# Patient Record
Sex: FEMALE | Race: OTHER | NOT HISPANIC OR LATINO | URBAN - METROPOLITAN AREA
[De-identification: names, ages, dates, MRNs, and addresses within clinical notes are randomized per-mention and may not be internally consistent; named-entity substitution may affect disease eponyms.]

---

## 2024-04-14 ENCOUNTER — EMERGENCY (EMERGENCY)
Facility: HOSPITAL | Age: 34
LOS: 0 days | Discharge: HOME | End: 2024-04-14
Attending: STUDENT IN AN ORGANIZED HEALTH CARE EDUCATION/TRAINING PROGRAM
Payer: COMMERCIAL

## 2024-04-14 VITALS
HEART RATE: 74 BPM | TEMPERATURE: 99 F | WEIGHT: 134.92 LBS | HEIGHT: 60 IN | OXYGEN SATURATION: 98 % | RESPIRATION RATE: 18 BRPM | SYSTOLIC BLOOD PRESSURE: 112 MMHG | DIASTOLIC BLOOD PRESSURE: 66 MMHG

## 2024-04-14 DIAGNOSIS — R10.9 UNSPECIFIED ABDOMINAL PAIN: ICD-10-CM

## 2024-04-14 DIAGNOSIS — O21.9 VOMITING OF PREGNANCY, UNSPECIFIED: ICD-10-CM

## 2024-04-14 DIAGNOSIS — O26.891 OTHER SPECIFIED PREGNANCY RELATED CONDITIONS, FIRST TRIMESTER: ICD-10-CM

## 2024-04-14 DIAGNOSIS — Z3A.01 LESS THAN 8 WEEKS GESTATION OF PREGNANCY: ICD-10-CM

## 2024-04-14 LAB
ALBUMIN SERPL ELPH-MCNC: 4.2 G/DL — SIGNIFICANT CHANGE UP (ref 3.5–5.2)
ALLERGY+IMMUNOLOGY DIAG STUDY NOTE: SIGNIFICANT CHANGE UP
ALP SERPL-CCNC: 65 U/L — SIGNIFICANT CHANGE UP (ref 30–115)
ALT FLD-CCNC: 8 U/L — SIGNIFICANT CHANGE UP (ref 0–41)
ANION GAP SERPL CALC-SCNC: 12 MMOL/L — SIGNIFICANT CHANGE UP (ref 7–14)
APPEARANCE UR: ABNORMAL
AST SERPL-CCNC: 13 U/L — SIGNIFICANT CHANGE UP (ref 0–41)
BACTERIA # UR AUTO: ABNORMAL /HPF
BASOPHILS # BLD AUTO: 0.02 K/UL — SIGNIFICANT CHANGE UP (ref 0–0.2)
BASOPHILS NFR BLD AUTO: 0.3 % — SIGNIFICANT CHANGE UP (ref 0–1)
BILIRUB SERPL-MCNC: 0.4 MG/DL — SIGNIFICANT CHANGE UP (ref 0.2–1.2)
BILIRUB UR-MCNC: NEGATIVE — SIGNIFICANT CHANGE UP
BLD GP AB SCN SERPL QL: SIGNIFICANT CHANGE UP
BUN SERPL-MCNC: 6 MG/DL — LOW (ref 10–20)
CALCIUM SERPL-MCNC: 9.3 MG/DL — SIGNIFICANT CHANGE UP (ref 8.4–10.5)
CAST: 2 /LPF — SIGNIFICANT CHANGE UP (ref 0–4)
CHLORIDE SERPL-SCNC: 103 MMOL/L — SIGNIFICANT CHANGE UP (ref 98–110)
CO2 SERPL-SCNC: 20 MMOL/L — SIGNIFICANT CHANGE UP (ref 17–32)
COLOR SPEC: YELLOW — SIGNIFICANT CHANGE UP
CREAT SERPL-MCNC: 0.7 MG/DL — SIGNIFICANT CHANGE UP (ref 0.7–1.5)
DIFF PNL FLD: NEGATIVE — SIGNIFICANT CHANGE UP
DIR ANTIGLOB POLYSPECIFIC INTERPRETATION: SIGNIFICANT CHANGE UP
EGFR: 117 ML/MIN/1.73M2 — SIGNIFICANT CHANGE UP
EOSINOPHIL # BLD AUTO: 0.07 K/UL — SIGNIFICANT CHANGE UP (ref 0–0.7)
EOSINOPHIL NFR BLD AUTO: 1 % — SIGNIFICANT CHANGE UP (ref 0–8)
GLUCOSE SERPL-MCNC: 79 MG/DL — SIGNIFICANT CHANGE UP (ref 70–99)
GLUCOSE UR QL: NEGATIVE MG/DL — SIGNIFICANT CHANGE UP
HCG SERPL-ACNC: HIGH MIU/ML
HCT VFR BLD CALC: 38.9 % — SIGNIFICANT CHANGE UP (ref 37–47)
HGB BLD-MCNC: 13.1 G/DL — SIGNIFICANT CHANGE UP (ref 12–16)
IMM GRANULOCYTES NFR BLD AUTO: 0.1 % — SIGNIFICANT CHANGE UP (ref 0.1–0.3)
KETONES UR-MCNC: NEGATIVE MG/DL — SIGNIFICANT CHANGE UP
LACTATE SERPL-SCNC: 1.1 MMOL/L — SIGNIFICANT CHANGE UP (ref 0.7–2)
LEUKOCYTE ESTERASE UR-ACNC: ABNORMAL
LIDOCAIN IGE QN: 44 U/L — SIGNIFICANT CHANGE UP (ref 7–60)
LYMPHOCYTES # BLD AUTO: 1.49 K/UL — SIGNIFICANT CHANGE UP (ref 1.2–3.4)
LYMPHOCYTES # BLD AUTO: 20.8 % — SIGNIFICANT CHANGE UP (ref 20.5–51.1)
MCHC RBC-ENTMCNC: 28.2 PG — SIGNIFICANT CHANGE UP (ref 27–31)
MCHC RBC-ENTMCNC: 33.7 G/DL — SIGNIFICANT CHANGE UP (ref 32–37)
MCV RBC AUTO: 83.7 FL — SIGNIFICANT CHANGE UP (ref 81–99)
MONOCYTES # BLD AUTO: 0.63 K/UL — HIGH (ref 0.1–0.6)
MONOCYTES NFR BLD AUTO: 8.8 % — SIGNIFICANT CHANGE UP (ref 1.7–9.3)
NEUTROPHILS # BLD AUTO: 4.93 K/UL — SIGNIFICANT CHANGE UP (ref 1.4–6.5)
NEUTROPHILS NFR BLD AUTO: 69 % — SIGNIFICANT CHANGE UP (ref 42.2–75.2)
NITRITE UR-MCNC: NEGATIVE — SIGNIFICANT CHANGE UP
NRBC # BLD: 0 /100 WBCS — SIGNIFICANT CHANGE UP (ref 0–0)
PH UR: 7.5 — SIGNIFICANT CHANGE UP (ref 5–8)
PLATELET # BLD AUTO: 210 K/UL — SIGNIFICANT CHANGE UP (ref 130–400)
PMV BLD: 11.7 FL — HIGH (ref 7.4–10.4)
POTASSIUM SERPL-MCNC: 4.2 MMOL/L — SIGNIFICANT CHANGE UP (ref 3.5–5)
POTASSIUM SERPL-SCNC: 4.2 MMOL/L — SIGNIFICANT CHANGE UP (ref 3.5–5)
PROT SERPL-MCNC: 6.8 G/DL — SIGNIFICANT CHANGE UP (ref 6–8)
PROT UR-MCNC: NEGATIVE MG/DL — SIGNIFICANT CHANGE UP
RBC # BLD: 4.65 M/UL — SIGNIFICANT CHANGE UP (ref 4.2–5.4)
RBC # FLD: 14.2 % — SIGNIFICANT CHANGE UP (ref 11.5–14.5)
RBC CASTS # UR COMP ASSIST: 2 /HPF — SIGNIFICANT CHANGE UP (ref 0–4)
SODIUM SERPL-SCNC: 135 MMOL/L — SIGNIFICANT CHANGE UP (ref 135–146)
SP GR SPEC: 1.01 — SIGNIFICANT CHANGE UP (ref 1–1.03)
SQUAMOUS # UR AUTO: 6 /HPF — HIGH (ref 0–5)
UROBILINOGEN FLD QL: 0.2 MG/DL — SIGNIFICANT CHANGE UP (ref 0.2–1)
WBC # BLD: 7.15 K/UL — SIGNIFICANT CHANGE UP (ref 4.8–10.8)
WBC # FLD AUTO: 7.15 K/UL — SIGNIFICANT CHANGE UP (ref 4.8–10.8)
WBC UR QL: 14 /HPF — HIGH (ref 0–5)

## 2024-04-14 PROCEDURE — 86870 RBC ANTIBODY IDENTIFICATION: CPT

## 2024-04-14 PROCEDURE — 86077 PHYS BLOOD BANK SERV XMATCH: CPT

## 2024-04-14 PROCEDURE — 84702 CHORIONIC GONADOTROPIN TEST: CPT

## 2024-04-14 PROCEDURE — 99284 EMERGENCY DEPT VISIT MOD MDM: CPT | Mod: 25

## 2024-04-14 PROCEDURE — 83605 ASSAY OF LACTIC ACID: CPT

## 2024-04-14 PROCEDURE — 80053 COMPREHEN METABOLIC PANEL: CPT

## 2024-04-14 PROCEDURE — 85025 COMPLETE CBC W/AUTO DIFF WBC: CPT

## 2024-04-14 PROCEDURE — 76830 TRANSVAGINAL US NON-OB: CPT | Mod: 26

## 2024-04-14 PROCEDURE — 86850 RBC ANTIBODY SCREEN: CPT

## 2024-04-14 PROCEDURE — 86900 BLOOD TYPING SEROLOGIC ABO: CPT

## 2024-04-14 PROCEDURE — 86880 COOMBS TEST DIRECT: CPT

## 2024-04-14 PROCEDURE — 86901 BLOOD TYPING SEROLOGIC RH(D): CPT

## 2024-04-14 PROCEDURE — 81001 URINALYSIS AUTO W/SCOPE: CPT

## 2024-04-14 PROCEDURE — 36415 COLL VENOUS BLD VENIPUNCTURE: CPT

## 2024-04-14 PROCEDURE — 87077 CULTURE AEROBIC IDENTIFY: CPT

## 2024-04-14 PROCEDURE — 83690 ASSAY OF LIPASE: CPT

## 2024-04-14 PROCEDURE — 87086 URINE CULTURE/COLONY COUNT: CPT

## 2024-04-14 PROCEDURE — 76830 TRANSVAGINAL US NON-OB: CPT

## 2024-04-14 PROCEDURE — 96374 THER/PROPH/DIAG INJ IV PUSH: CPT

## 2024-04-14 PROCEDURE — 99284 EMERGENCY DEPT VISIT MOD MDM: CPT

## 2024-04-14 RX ORDER — ONDANSETRON 8 MG/1
4 TABLET, FILM COATED ORAL ONCE
Refills: 0 | Status: COMPLETED | OUTPATIENT
Start: 2024-04-14 | End: 2024-04-14

## 2024-04-14 RX ORDER — ONDANSETRON 8 MG/1
1 TABLET, FILM COATED ORAL
Qty: 1 | Refills: 0
Start: 2024-04-14 | End: 2024-04-16

## 2024-04-14 RX ORDER — SODIUM CHLORIDE 9 MG/ML
2000 INJECTION, SOLUTION INTRAVENOUS ONCE
Refills: 0 | Status: COMPLETED | OUTPATIENT
Start: 2024-04-14 | End: 2024-04-14

## 2024-04-14 RX ADMIN — SODIUM CHLORIDE 2000 MILLILITER(S): 9 INJECTION, SOLUTION INTRAVENOUS at 14:06

## 2024-04-14 RX ADMIN — ONDANSETRON 4 MILLIGRAM(S): 8 TABLET, FILM COATED ORAL at 14:05

## 2024-04-14 NOTE — ED PROVIDER NOTE - NSFOLLOWUPCLINICS_GEN_ALL_ED_FT
[facility]  OB/GYN  [address] [place] [address]  Phone: [phone]  Fax:   Follow Up Time: 1-3 Days

## 2024-04-14 NOTE — ED PROVIDER NOTE - PATIENT PORTAL LINK FT
You can access the FollowMyHealth Patient Portal offered by [facility] by registering at the following website: [email or web address]. By joining [employer]’s FollowMyHealth portal, you will also be able to view your health information using other applications (apps) compatible with our system.

## 2024-04-14 NOTE — ED PROVIDER NOTE - CLINICAL SUMMARY MEDICAL DECISION MAKING FREE TEXT BOX
Patient with nausea vomiting G2, P0 nausea 5 days currently 6 weeks pregnant abdominal cramping not improved with B6 and doxylamine no vaginal bleeding    Pt feeling improved, tolerating PO, abdomen soft, non-tender, non-distended, no rebound, no guarding.   Imaging was ordered and reviewed by me.  Appropriate medications for patient's presenting complaints were ordered and effects were reassessed.  Patient's records (prior hospital, ED visit, and/or nursing home notes if available) were reviewed.  Additional history was obtained from EMS, family, and/or PCP (where available).  Escalation to admission/observation was considered.  However patient feels much better and is comfortable with discharge.  Appropriate follow-up was arranged. Results reviewed and discussed with pt and printed for patient. Anticipatory guidance given including close outpatient followup. Strict return precautions given. Pt verbalizes understanding of and agrees with plan.

## 2024-04-14 NOTE — ED ADULT NURSE NOTE - OBJECTIVE STATEMENT
Pt 6 weeks pregnant c/o nausea vomiting with abd cramping, no vaginal bleeding. Pt has had appointment w/ OBGYN, placed on Diclegis w/ no effect. OBGYN aware of abd cramping, patient states OB was unable to visualize pregnancy due to recent pregnancy.

## 2024-04-14 NOTE — ED ADULT TRIAGE NOTE - WEIGHT IN KG
[facility] calling, received rx for rebif, pharmacy [facility] is ready to ship, 4 phone calls have been made out the patient by  [employer] and patient cannot be reached, [facility] cannot be reached. Pt is no returning phone calls to pharmacy or [employer].    If nurse is able to contact patient, [facility] has left several message with patient, she can call [phone]

## 2024-04-14 NOTE — ED ADULT NURSE NOTE - NSFALLUNIVINTERV_ED_ALL_ED
Bed/Stretcher in lowest position, wheels locked, appropriate side rails in place/Call bell, personal items and telephone in reach/Instruct patient to call for assistance before getting out of bed/chair/stretcher/Non-slip footwear applied when patient is off stretcher/[place] to call system/Physically safe environment - no spills, clutter or unnecessary equipment/Purposeful proactive rounding/Room/bathroom lighting operational, light cord in reach

## 2024-04-14 NOTE — ED PROVIDER NOTE - OBJECTIVE STATEMENT
33-year-old female G2, P0 presents with nausea x 5 days.  Patient is currently 6 weeks pregnant.  Has been having nausea with mild abd cramping. Denies chest pain shortness of breath difficulty breathing recent travels cough cold congestion.  Saw her OB/GYN as has been taking B6 and doxylamine with minimal relief.  Denies any vaginal bleeding patient has appointment with her OB/GYN for transvaginal ultrasound next week.  On previous ultrasound was found to have subchorionic hemorrhage.    As per lab patient found to have positive antibodies.  Admits that in February 2024 had a miscarriage and received RhoGAM.

## 2024-04-14 NOTE — ED PROVIDER NOTE - PROGRESS NOTE DETAILS
ss Lab will add betahcg now.   Tolerated p.o.  Reviewed labs and imaging with patient and mother at bedside.  Patient will follow-up with OB and strict return precautions given

## 2024-04-16 LAB
CULTURE RESULTS: ABNORMAL
SPECIMEN SOURCE: SIGNIFICANT CHANGE UP

## 2024-04-17 ENCOUNTER — EMERGENCY (EMERGENCY)
Facility: HOSPITAL | Age: 34
LOS: 0 days | Discharge: HOME | End: 2024-04-18
Attending: STUDENT IN AN ORGANIZED HEALTH CARE EDUCATION/TRAINING PROGRAM
Payer: COMMERCIAL

## 2024-04-17 VITALS
WEIGHT: 138.01 LBS | RESPIRATION RATE: 18 BRPM | HEIGHT: 60 IN | SYSTOLIC BLOOD PRESSURE: 105 MMHG | HEART RATE: 72 BPM | OXYGEN SATURATION: 100 % | DIASTOLIC BLOOD PRESSURE: 61 MMHG | TEMPERATURE: 98 F

## 2024-04-17 DIAGNOSIS — O21.9 VOMITING OF PREGNANCY, UNSPECIFIED: ICD-10-CM

## 2024-04-17 DIAGNOSIS — Z3A.01 LESS THAN 8 WEEKS GESTATION OF PREGNANCY: ICD-10-CM

## 2024-04-17 PROCEDURE — 99284 EMERGENCY DEPT VISIT MOD MDM: CPT | Mod: 25

## 2024-04-17 PROCEDURE — 85025 COMPLETE CBC W/AUTO DIFF WBC: CPT

## 2024-04-17 PROCEDURE — 99284 EMERGENCY DEPT VISIT MOD MDM: CPT

## 2024-04-17 PROCEDURE — 83690 ASSAY OF LIPASE: CPT

## 2024-04-17 PROCEDURE — 36415 COLL VENOUS BLD VENIPUNCTURE: CPT

## 2024-04-17 PROCEDURE — 96374 THER/PROPH/DIAG INJ IV PUSH: CPT

## 2024-04-17 PROCEDURE — 84702 CHORIONIC GONADOTROPIN TEST: CPT

## 2024-04-17 PROCEDURE — 80053 COMPREHEN METABOLIC PANEL: CPT

## 2024-04-18 LAB
ALBUMIN SERPL ELPH-MCNC: 4.4 G/DL — SIGNIFICANT CHANGE UP (ref 3.5–5.2)
ALP SERPL-CCNC: 65 U/L — SIGNIFICANT CHANGE UP (ref 30–115)
ALT FLD-CCNC: 8 U/L — SIGNIFICANT CHANGE UP (ref 0–41)
ANION GAP SERPL CALC-SCNC: 13 MMOL/L — SIGNIFICANT CHANGE UP (ref 7–14)
AST SERPL-CCNC: 16 U/L — SIGNIFICANT CHANGE UP (ref 0–41)
BASOPHILS # BLD AUTO: 0.02 K/UL — SIGNIFICANT CHANGE UP (ref 0–0.2)
BASOPHILS NFR BLD AUTO: 0.2 % — SIGNIFICANT CHANGE UP (ref 0–1)
BILIRUB SERPL-MCNC: 0.5 MG/DL — SIGNIFICANT CHANGE UP (ref 0.2–1.2)
BUN SERPL-MCNC: 4 MG/DL — LOW (ref 10–20)
CALCIUM SERPL-MCNC: 9.2 MG/DL — SIGNIFICANT CHANGE UP (ref 8.4–10.5)
CHLORIDE SERPL-SCNC: 105 MMOL/L — SIGNIFICANT CHANGE UP (ref 98–110)
CO2 SERPL-SCNC: 18 MMOL/L — SIGNIFICANT CHANGE UP (ref 17–32)
CREAT SERPL-MCNC: 0.6 MG/DL — LOW (ref 0.7–1.5)
EGFR: 121 ML/MIN/1.73M2 — SIGNIFICANT CHANGE UP
EOSINOPHIL # BLD AUTO: 0.08 K/UL — SIGNIFICANT CHANGE UP (ref 0–0.7)
EOSINOPHIL NFR BLD AUTO: 0.8 % — SIGNIFICANT CHANGE UP (ref 0–8)
GLUCOSE SERPL-MCNC: 89 MG/DL — SIGNIFICANT CHANGE UP (ref 70–99)
HCG SERPL-ACNC: HIGH MIU/ML
HCT VFR BLD CALC: 39.2 % — SIGNIFICANT CHANGE UP (ref 37–47)
HGB BLD-MCNC: 13.1 G/DL — SIGNIFICANT CHANGE UP (ref 12–16)
IMM GRANULOCYTES NFR BLD AUTO: 0.2 % — SIGNIFICANT CHANGE UP (ref 0.1–0.3)
LIDOCAIN IGE QN: 30 U/L — SIGNIFICANT CHANGE UP (ref 7–60)
LYMPHOCYTES # BLD AUTO: 1.82 K/UL — SIGNIFICANT CHANGE UP (ref 1.2–3.4)
LYMPHOCYTES # BLD AUTO: 18.7 % — LOW (ref 20.5–51.1)
MCHC RBC-ENTMCNC: 28.2 PG — SIGNIFICANT CHANGE UP (ref 27–31)
MCHC RBC-ENTMCNC: 33.4 G/DL — SIGNIFICANT CHANGE UP (ref 32–37)
MCV RBC AUTO: 84.3 FL — SIGNIFICANT CHANGE UP (ref 81–99)
MONOCYTES # BLD AUTO: 0.7 K/UL — HIGH (ref 0.1–0.6)
MONOCYTES NFR BLD AUTO: 7.2 % — SIGNIFICANT CHANGE UP (ref 1.7–9.3)
NEUTROPHILS # BLD AUTO: 7.11 K/UL — HIGH (ref 1.4–6.5)
NEUTROPHILS NFR BLD AUTO: 72.9 % — SIGNIFICANT CHANGE UP (ref 42.2–75.2)
NRBC # BLD: 0 /100 WBCS — SIGNIFICANT CHANGE UP (ref 0–0)
PLATELET # BLD AUTO: 211 K/UL — SIGNIFICANT CHANGE UP (ref 130–400)
PMV BLD: 11.8 FL — HIGH (ref 7.4–10.4)
POTASSIUM SERPL-MCNC: 3.8 MMOL/L — SIGNIFICANT CHANGE UP (ref 3.5–5)
POTASSIUM SERPL-SCNC: 3.8 MMOL/L — SIGNIFICANT CHANGE UP (ref 3.5–5)
PROT SERPL-MCNC: 7 G/DL — SIGNIFICANT CHANGE UP (ref 6–8)
RBC # BLD: 4.65 M/UL — SIGNIFICANT CHANGE UP (ref 4.2–5.4)
RBC # FLD: 13.9 % — SIGNIFICANT CHANGE UP (ref 11.5–14.5)
SODIUM SERPL-SCNC: 136 MMOL/L — SIGNIFICANT CHANGE UP (ref 135–146)
WBC # BLD: 9.75 K/UL — SIGNIFICANT CHANGE UP (ref 4.8–10.8)
WBC # FLD AUTO: 9.75 K/UL — SIGNIFICANT CHANGE UP (ref 4.8–10.8)

## 2024-04-18 RX ORDER — CEFTRIAXONE 500 MG/1
1000 INJECTION, POWDER, FOR SOLUTION INTRAMUSCULAR; INTRAVENOUS ONCE
Refills: 0 | Status: COMPLETED | OUTPATIENT
Start: 2024-04-18 | End: 2024-04-18

## 2024-04-18 RX ORDER — CEFPODOXIME PROXETIL 100 MG
1 TABLET ORAL
Qty: 14 | Refills: 0
Start: 2024-04-18 | End: 2024-04-24

## 2024-04-18 RX ORDER — SODIUM CHLORIDE 9 MG/ML
1000 INJECTION, SOLUTION INTRAVENOUS ONCE
Refills: 0 | Status: COMPLETED | OUTPATIENT
Start: 2024-04-18 | End: 2024-04-18

## 2024-04-18 RX ADMIN — Medication 25 MILLIGRAM(S): at 01:01

## 2024-04-18 RX ADMIN — SODIUM CHLORIDE 1000 MILLILITER(S): 9 INJECTION, SOLUTION INTRAVENOUS at 01:01

## 2024-04-18 RX ADMIN — CEFTRIAXONE 100 MILLIGRAM(S): 500 INJECTION, POWDER, FOR SOLUTION INTRAMUSCULAR; INTRAVENOUS at 03:10

## 2024-04-18 NOTE — ED ADULT NURSE NOTE - NSFALLOOBATTEMPT_ED_ALL_ED
Problem: Gas Exchange - Impaired  Goal: Absence of hypoxia  Outcome: Ongoing     Problem: Breathing Pattern - Ineffective  Goal: Ability to achieve and maintain a regular respiratory rate  Outcome: Ongoing     Problem: Isolation Precautions - Risk of Spread of Infection  Goal: Prevent transmission of infection  Outcome: Ongoing     Problem: Fatigue  Goal: Verbalize increase energy and improved vitality  Outcome: Ongoing     Problem: Falls - Risk of:  Goal: Will remain free from falls  Description: Will remain free from falls  Outcome: Ongoing     Problem: Falls - Risk of:  Goal: Absence of physical injury  Description: Absence of physical injury  Outcome: Ongoing No

## 2024-04-18 NOTE — ED PROVIDER NOTE - OBJECTIVE STATEMENT
Pt is a 34 y/o female with no PMH, 7 weeks pregnant,  presenting for nausea and vomiting. Pt reports she has been very nauseous throughout pregnancy and was on doxylamine and B6 as prescribed by her OB Dr. Sánchez. Came to ED 2 days ago for nausea and was sent home with zofran. Pt says her OB told her to stop taking zofran and restarted the doxylamine and B6. Pt's urine culture was positive and she says she was not sent antibiotics. No fever, abdominal pain, vaginal bleeding or diarrhea. Reports vomiting about 2-3x a day.

## 2024-04-18 NOTE — ED PROVIDER NOTE - CLINICAL SUMMARY MEDICAL DECISION MAKING FREE TEXT BOX
32 y/o female with no PMH, 7 weeks pregnant,  presenting for nausea and vomiting. Pt reports she has been very nauseous throughout pregnancy and was on doxylamine and B6 as prescribed by her OB Dr. Sánchez. Came to ED 2 days ago for nausea and was sent home with zofran. Pt says her OB told her to stop taking zofran and restarted the doxylamine and B6. Pt's urine culture was positive and she says she was not sent antibiotics. No fever, abdominal pain, vaginal bleeding or diarrhea. Reports vomiting about 2-3x a day.   felt better by promethazine given abx for uti and po challenged tolerated and discharge with gyn follow up

## 2024-04-18 NOTE — ED PROVIDER NOTE - CARE PROVIDER_API CALL
Tavon Gil  Obstetrics and Gynecology  [address] [place] [id number]  Phone: [phone]  Fax: [phone]  Follow Up Time: 1-3 Days

## 2024-04-18 NOTE — ED ADULT NURSE NOTE - OBJECTIVE STATEMENT
Pt presents to ED is 7 weeks pregnant complaining of nausea and vomiting and sore throat due to the vomiting.

## 2024-04-18 NOTE — ED PROVIDER NOTE - ATTENDING CONTRIBUTION TO CARE
I have personally performed a history and physical exam on this patient and personally directed the management of the patient.  Pt is a 34 y/o female with no PMH, 7 weeks pregnant,  presenting for nausea and vomiting. Pt reports she has been very nauseous throughout pregnancy and was on doxylamine and B6 as prescribed by her OB Dr. Sánchez. Came to ED 2 days ago for nausea and was sent home with zofran. Pt says her OB told her to stop taking zofran and restarted the doxylamine and B6. Pt's urine culture was positive and she says she was not sent antibiotics.   CON: appears stated age, pleasant, no acute distress, HENMT: normocephalic, atraumatic, anicteric, no conjunctival injection,  CV: regular rhythm, distal pulses intact, RESP: no acute respiratory distress, no stridor, breathing comfortably on RA , GI:  soft, nontender, no rebound, no guarding, SKIN: no wounds MSK: no deformities, NEURO: no gross motor or sensory deficit Psychiatric: appropriate mood, appropriate affect  will send labs give ivf promethazine and reevaluate

## 2024-04-18 NOTE — ED PROVIDER NOTE - PATIENT PORTAL LINK FT
You can access the FollowMyHealth Patient Portal offered by [facility] by registering at the following website: [email or web address]. By joining [employer]’s FollowMyHealth portal, you will also be able to view your health information using other applications (apps) compatible with our system.

## 2024-04-18 NOTE — ED ADULT NURSE NOTE - NSFALLUNIVINTERV_ED_ALL_ED
Bed/Stretcher in lowest position, wheels locked, appropriate side rails in place/Call bell, personal items and telephone in reach/Instruct patient to call for assistance before getting out of bed/chair/stretcher/Non-slip footwear applied when patient is off stretcher/[place] to call system/Physically safe environment - no spills, clutter or unnecessary equipment/Purposeful proactive rounding/Room/bathroom lighting operational, light cord in reach

## 2024-04-18 NOTE — ED PROVIDER NOTE - PHYSICAL EXAMINATION
CONST: well appearing for age  HEAD:  normocephalic, atraumatic  EYES:  conjunctivae without injection, drainage or discharge  ENMT:  nasal mucosa moist; mouth moist without ulcerations or lesions; throat moist without erythema, exudate, ulcerations or lesions  NECK:  supple  CARDIAC:  regular rate and rhythm, normal S1 and S2, no murmurs, rubs or gallops  RESP:  respiratory rate and effort appear normal for age; lungs are clear to auscultation bilaterally; no rales or wheezes  ABDOMEN:  soft, nontender, nondistended  MUSCULOSKELETAL/NEURO: awake and alert, GALLEGOS, normal movement, normal tone  SKIN:  normal skin color for age and race, well-perfused; warm and dry

## 2024-04-30 ENCOUNTER — EMERGENCY (EMERGENCY)
Facility: HOSPITAL | Age: 34
LOS: 0 days | Discharge: HOME | End: 2024-05-01
Attending: EMERGENCY MEDICINE
Payer: COMMERCIAL

## 2024-04-30 VITALS
RESPIRATION RATE: 18 BRPM | DIASTOLIC BLOOD PRESSURE: 81 MMHG | SYSTOLIC BLOOD PRESSURE: 120 MMHG | TEMPERATURE: 99 F | WEIGHT: 132.06 LBS | HEIGHT: 60 IN | OXYGEN SATURATION: 98 % | HEART RATE: 64 BPM

## 2024-04-30 LAB
ALBUMIN SERPL ELPH-MCNC: 4.4 G/DL — SIGNIFICANT CHANGE UP (ref 3.5–5.2)
ALLERGY+IMMUNOLOGY DIAG STUDY NOTE: SIGNIFICANT CHANGE UP
ALP SERPL-CCNC: 68 U/L — SIGNIFICANT CHANGE UP (ref 30–115)
ALT FLD-CCNC: 13 U/L — SIGNIFICANT CHANGE UP (ref 0–41)
ANION GAP SERPL CALC-SCNC: 12 MMOL/L — SIGNIFICANT CHANGE UP (ref 7–14)
AST SERPL-CCNC: 19 U/L — SIGNIFICANT CHANGE UP (ref 0–41)
B-OH-BUTYR SERPL-SCNC: 0.5 MMOL/L — HIGH
BASE EXCESS BLDV CALC-SCNC: -0.8 MMOL/L — SIGNIFICANT CHANGE UP (ref -2–3)
BASOPHILS # BLD AUTO: 0.02 K/UL — SIGNIFICANT CHANGE UP (ref 0–0.2)
BASOPHILS NFR BLD AUTO: 0.2 % — SIGNIFICANT CHANGE UP (ref 0–1)
BILIRUB SERPL-MCNC: 0.5 MG/DL — SIGNIFICANT CHANGE UP (ref 0.2–1.2)
BLD GP AB SCN SERPL QL: SIGNIFICANT CHANGE UP
BUN SERPL-MCNC: 6 MG/DL — LOW (ref 10–20)
CA-I SERPL-SCNC: 1.26 MMOL/L — SIGNIFICANT CHANGE UP (ref 1.15–1.33)
CALCIUM SERPL-MCNC: 9.4 MG/DL — SIGNIFICANT CHANGE UP (ref 8.4–10.5)
CHLORIDE SERPL-SCNC: 105 MMOL/L — SIGNIFICANT CHANGE UP (ref 98–110)
CO2 SERPL-SCNC: 21 MMOL/L — SIGNIFICANT CHANGE UP (ref 17–32)
CREAT SERPL-MCNC: 0.6 MG/DL — LOW (ref 0.7–1.5)
DIR ANTIGLOB POLYSPECIFIC INTERPRETATION: SIGNIFICANT CHANGE UP
EGFR: 121 ML/MIN/1.73M2 — SIGNIFICANT CHANGE UP
EOSINOPHIL # BLD AUTO: 0.16 K/UL — SIGNIFICANT CHANGE UP (ref 0–0.7)
EOSINOPHIL NFR BLD AUTO: 1.8 % — SIGNIFICANT CHANGE UP (ref 0–8)
GAS PNL BLDV: 136 MMOL/L — SIGNIFICANT CHANGE UP (ref 136–145)
GAS PNL BLDV: SIGNIFICANT CHANGE UP
GAS PNL BLDV: SIGNIFICANT CHANGE UP
GLUCOSE SERPL-MCNC: 90 MG/DL — SIGNIFICANT CHANGE UP (ref 70–99)
HCG SERPL-ACNC: HIGH MIU/ML
HCO3 BLDV-SCNC: 24 MMOL/L — SIGNIFICANT CHANGE UP (ref 22–29)
HCT VFR BLD CALC: 40.8 % — SIGNIFICANT CHANGE UP (ref 37–47)
HCT VFR BLDA CALC: 41 % — SIGNIFICANT CHANGE UP (ref 34.5–46.5)
HGB BLD CALC-MCNC: 13.7 G/DL — SIGNIFICANT CHANGE UP (ref 11.7–16.1)
HGB BLD-MCNC: 13.5 G/DL — SIGNIFICANT CHANGE UP (ref 12–16)
IMM GRANULOCYTES NFR BLD AUTO: 0.2 % — SIGNIFICANT CHANGE UP (ref 0.1–0.3)
LACTATE BLDV-MCNC: 1.5 MMOL/L — SIGNIFICANT CHANGE UP (ref 0.5–2)
LIDOCAIN IGE QN: 36 U/L — SIGNIFICANT CHANGE UP (ref 7–60)
LYMPHOCYTES # BLD AUTO: 1.59 K/UL — SIGNIFICANT CHANGE UP (ref 1.2–3.4)
LYMPHOCYTES # BLD AUTO: 17.4 % — LOW (ref 20.5–51.1)
MCHC RBC-ENTMCNC: 28.1 PG — SIGNIFICANT CHANGE UP (ref 27–31)
MCHC RBC-ENTMCNC: 33.1 G/DL — SIGNIFICANT CHANGE UP (ref 32–37)
MCV RBC AUTO: 85 FL — SIGNIFICANT CHANGE UP (ref 81–99)
MONOCYTES # BLD AUTO: 0.56 K/UL — SIGNIFICANT CHANGE UP (ref 0.1–0.6)
MONOCYTES NFR BLD AUTO: 6.1 % — SIGNIFICANT CHANGE UP (ref 1.7–9.3)
NEUTROPHILS # BLD AUTO: 6.78 K/UL — HIGH (ref 1.4–6.5)
NEUTROPHILS NFR BLD AUTO: 74.3 % — SIGNIFICANT CHANGE UP (ref 42.2–75.2)
NRBC # BLD: 0 /100 WBCS — SIGNIFICANT CHANGE UP (ref 0–0)
PCO2 BLDV: 41 MMHG — SIGNIFICANT CHANGE UP (ref 39–42)
PH BLDV: 7.38 — SIGNIFICANT CHANGE UP (ref 7.32–7.43)
PLATELET # BLD AUTO: 238 K/UL — SIGNIFICANT CHANGE UP (ref 130–400)
PMV BLD: 11.5 FL — HIGH (ref 7.4–10.4)
PO2 BLDV: 18 MMHG — LOW (ref 25–45)
POTASSIUM BLDV-SCNC: 4.1 MMOL/L — SIGNIFICANT CHANGE UP (ref 3.5–5.1)
POTASSIUM SERPL-MCNC: 4.6 MMOL/L — SIGNIFICANT CHANGE UP (ref 3.5–5)
POTASSIUM SERPL-SCNC: 4.6 MMOL/L — SIGNIFICANT CHANGE UP (ref 3.5–5)
PROT SERPL-MCNC: 7 G/DL — SIGNIFICANT CHANGE UP (ref 6–8)
RBC # BLD: 4.8 M/UL — SIGNIFICANT CHANGE UP (ref 4.2–5.4)
RBC # FLD: 13.2 % — SIGNIFICANT CHANGE UP (ref 11.5–14.5)
SAO2 % BLDV: 23.5 % — LOW (ref 67–88)
SODIUM SERPL-SCNC: 138 MMOL/L — SIGNIFICANT CHANGE UP (ref 135–146)
WBC # BLD: 9.13 K/UL — SIGNIFICANT CHANGE UP (ref 4.8–10.8)
WBC # FLD AUTO: 9.13 K/UL — SIGNIFICANT CHANGE UP (ref 4.8–10.8)

## 2024-04-30 PROCEDURE — 83690 ASSAY OF LIPASE: CPT

## 2024-04-30 PROCEDURE — 99284 EMERGENCY DEPT VISIT MOD MDM: CPT | Mod: 25

## 2024-04-30 PROCEDURE — 86900 BLOOD TYPING SEROLOGIC ABO: CPT

## 2024-04-30 PROCEDURE — 82330 ASSAY OF CALCIUM: CPT

## 2024-04-30 PROCEDURE — 86901 BLOOD TYPING SEROLOGIC RH(D): CPT

## 2024-04-30 PROCEDURE — 76830 TRANSVAGINAL US NON-OB: CPT

## 2024-04-30 PROCEDURE — 86870 RBC ANTIBODY IDENTIFICATION: CPT

## 2024-04-30 PROCEDURE — 84132 ASSAY OF SERUM POTASSIUM: CPT

## 2024-04-30 PROCEDURE — 86880 COOMBS TEST DIRECT: CPT

## 2024-04-30 PROCEDURE — 82010 KETONE BODYS QUAN: CPT

## 2024-04-30 PROCEDURE — 99285 EMERGENCY DEPT VISIT HI MDM: CPT

## 2024-04-30 PROCEDURE — 85025 COMPLETE CBC W/AUTO DIFF WBC: CPT

## 2024-04-30 PROCEDURE — 86850 RBC ANTIBODY SCREEN: CPT

## 2024-04-30 PROCEDURE — 81003 URINALYSIS AUTO W/O SCOPE: CPT

## 2024-04-30 PROCEDURE — 96375 TX/PRO/DX INJ NEW DRUG ADDON: CPT

## 2024-04-30 PROCEDURE — 83605 ASSAY OF LACTIC ACID: CPT

## 2024-04-30 PROCEDURE — 84295 ASSAY OF SERUM SODIUM: CPT

## 2024-04-30 PROCEDURE — 96376 TX/PRO/DX INJ SAME DRUG ADON: CPT

## 2024-04-30 PROCEDURE — 76830 TRANSVAGINAL US NON-OB: CPT | Mod: 26

## 2024-04-30 PROCEDURE — 85014 HEMATOCRIT: CPT

## 2024-04-30 PROCEDURE — 84702 CHORIONIC GONADOTROPIN TEST: CPT

## 2024-04-30 PROCEDURE — 82803 BLOOD GASES ANY COMBINATION: CPT

## 2024-04-30 PROCEDURE — 80053 COMPREHEN METABOLIC PANEL: CPT

## 2024-04-30 PROCEDURE — 96374 THER/PROPH/DIAG INJ IV PUSH: CPT

## 2024-04-30 PROCEDURE — 36415 COLL VENOUS BLD VENIPUNCTURE: CPT

## 2024-04-30 PROCEDURE — 86077 PHYS BLOOD BANK SERV XMATCH: CPT

## 2024-04-30 PROCEDURE — 87086 URINE CULTURE/COLONY COUNT: CPT

## 2024-04-30 PROCEDURE — 85018 HEMOGLOBIN: CPT

## 2024-04-30 RX ORDER — SODIUM CHLORIDE 9 MG/ML
1000 INJECTION INTRAMUSCULAR; INTRAVENOUS; SUBCUTANEOUS ONCE
Refills: 0 | Status: COMPLETED | OUTPATIENT
Start: 2024-04-30 | End: 2024-04-30

## 2024-04-30 RX ORDER — ONDANSETRON 8 MG/1
4 TABLET, FILM COATED ORAL ONCE
Refills: 0 | Status: COMPLETED | OUTPATIENT
Start: 2024-04-30 | End: 2024-04-30

## 2024-04-30 RX ORDER — METOCLOPRAMIDE HCL 10 MG
10 TABLET ORAL ONCE
Refills: 0 | Status: COMPLETED | OUTPATIENT
Start: 2024-04-30 | End: 2024-04-30

## 2024-04-30 RX ADMIN — SODIUM CHLORIDE 1000 MILLILITER(S): 9 INJECTION INTRAMUSCULAR; INTRAVENOUS; SUBCUTANEOUS at 19:04

## 2024-04-30 RX ADMIN — ONDANSETRON 4 MILLIGRAM(S): 8 TABLET, FILM COATED ORAL at 21:43

## 2024-04-30 RX ADMIN — SODIUM CHLORIDE 1000 MILLILITER(S): 9 INJECTION INTRAMUSCULAR; INTRAVENOUS; SUBCUTANEOUS at 21:43

## 2024-04-30 RX ADMIN — Medication 104 MILLIGRAM(S): at 19:03

## 2024-04-30 NOTE — ED PROVIDER NOTE - CLINICAL SUMMARY MEDICAL DECISION MAKING FREE TEXT BOX
Patient feels better, tolerating p.o., seen and cleared by OB/GYN, states does not need RhoGAM.  Recommends prescription for Zofran which was sent to pharmacy.  DC home follow-up with her OB/GYN tomorrow has appointment scheduled, strict return precautions provided

## 2024-04-30 NOTE — CONSULT NOTE ADULT - SUBJECTIVE AND OBJECTIVE BOX
PGY2 Note  Chief Complaint: nausea    HPI: 34 yo  @8w5d by LMP , presents for nausea and vomiting. This is pts 3rd ED visit in the past month for similar complaint. Reports symptoms not relieved by B6 or unisom. Had improvement with zofran and compazine however told to stop taking it by NP. Currently not taking any medication. Reports vomiting 2-3x per day and constant nausea. Vomited only 1x today, ate a banana. Denies abdominal pain, fever, chills, N/V, diarrhea, constipation, vaginal bleeding. Had SAB in February and received Rhogam.     Denies the following: constitutional symptoms, visual symptoms, cardiovascular symptoms, respiratory symptoms, GI symptoms, musculoskeletal symptoms, skin symptoms, neurologic symptoms, hematologic symptoms, allergic symptoms, psychiatric symptoms  Except any pertinent positives listed.     Ob/Gyn History:                   LMP -                   Cycle Length - regular  SAB x1  Denies history of ovarian cysts, uterine fibroids, abnormal paps, or STIs    Home Medications:  None    Allergies  No Known Allergies  Intoleranc    PAST MEDICAL & SURGICAL HISTORY:  NA    FAMILY HISTORY:  NA    SOCIAL HISTORY: Denies cigarette use, alcohol use, or illicit drug use    Vital Signs Last 24 Hrs  T(F): 99.1 (2024 16:51), Max: 99.1 (2024 16:51)  HR: 64 (2024 16:51) (64 - 64)  BP: 120/81 (2024 16:51) (120/81 - 120/81)  RR: 18 (2024 16:51) (18 - 18)  Height (cm): 152.4 (24 @ 16:51)  Weight (kg): 59.9 (24 @ 16:51)  BMI (kg/m2): 25.8 (24 @ 16:51)  BSA (m2): 1.56 (24 @ 16:51)      General Appearance - AAOx3, NAD  Abdomen - Soft, nontender, nondistended, no rebound, no rigidity, no guarding, bowel sounds present    Meds:   (ADM OVERRIDE) 1 each &lt;see task&gt; GiveOnce  (ADM OVERRIDE) 1 each &lt;see task&gt; GiveOnce  metoclopramide  IVPB 10 milliGRAM(s) IV Intermittent Once  ondansetron Injectable 4 milliGRAM(s) IV Push once  sodium chloride 0.9% Bolus 1000 milliLiter(s) IV Bolus once  sodium chloride 0.9% Bolus 1000 milliLiter(s) IV Bolus once    Height (cm): 152.4 (24 @ 16:51)  Weight (kg): 59.9 (24 @ 16:51)  BMI (kg/m2): 25.8 (24 @ 16:51)  BSA (m2): 1.56 (24 @ 16:51)    LABS:                        13.5   9.13  )-----------( 238      ( 2024 18:47 )             40.8     HCG Quantitative, Serum: 57453.0 mIU/mL (24 @ 18:47)    ABO RH Interpretation: O NEG (24 @ 18:47)  Antibody Screen: POS (24 @ 18:47)        138  |  105  |  6<L>  ----------------------------<  90  4.6   |  21  |  0.6<L>    Ca    9.4      2024 18:47    TPro  7.0  /  Alb  4.4  /  TBili  0.5  /  DBili  x   /  AST  19  /  ALT  13  /  AlkPhos  68        Urinalysis Basic - ( 2024 18:47 )    Color: x / Appearance: x / SG: x / pH: x  Gluc: 90 mg/dL / Ketone: x  / Bili: x / Urobili: x   Blood: x / Protein: x / Nitrite: x   Leuk Esterase: x / RBC: x / WBC x   Sq Epi: x / Non Sq Epi: x / Bacteria: x    RADIOLOGY & ADDITIONAL STUDIES:  < from: US Transvaginal (24 @ 20:24) >    INTERPRETATION:  CLINICAL INFORMATION: Vomiting, cramps. 9 weeks pregnant    LMP: 2024. Patient reports being pregnant at time of the exam.    COMPARISON: Pelvic ultrasound 2024.    TECHNIQUE:  Endovaginal and transabdominal pelvic sonogram. Color and Spectral   Doppler was performed.    FINDINGS:  Uterus: 9.4 x 5.4 x 7.7 cm. Visible intrauterine pregnancy with yolk sac.   Crown-rump length 1.77 cm. Gestational sac length 3.29 cm. Fetal heart   rate 173 bpm. Subchorionic hemorrhage measuring 2.6 x 1.0 x 1.5 cm.   Subserosal fibroid measuring up to 2 cm.    Right ovary: 3.6 x 1.8 x 3.6 cm. Within normal limits. Vascular flow   demonstrated to the right ovary.  Left ovary: Left ovary not visualized.    Fluid: None.      IMPRESSION:      Viable intrauterine pregnancy.    Estimated gestational age is 8 weeks and 2 days.    Subchorionic hemorrhage measuring 2.6 x 1.0 x 1.5 cm          < end of copied text >   PGY2 Note  Chief Complaint: nausea    HPI: 32 yo  @8w5d by LMP , presents for nausea and vomiting. This is pts 3rd ED visit in the past month for similar complaint. Reports symptoms not relieved by B6 or unisom. Had improvement with zofran and compazine however told to stop taking it by an NP. Currently not taking any medication. Reports vomiting 2-3x per day and constant nausea. Vomited only 1x today, ate a banana. Denies abdominal pain, fever, chills, N/V, diarrhea, constipation, vaginal bleeding. Had SAB in February and received Rhogam.     Denies the following: constitutional symptoms, visual symptoms, cardiovascular symptoms, respiratory symptoms, GI symptoms, musculoskeletal symptoms, skin symptoms, neurologic symptoms, hematologic symptoms, allergic symptoms, psychiatric symptoms  Except any pertinent positives listed.     Ob/Gyn History:                   LMP -                   Cycle Length - regular  SAB x1  Denies history of ovarian cysts, uterine fibroids, abnormal paps, or STIs    Home Medications:  None    Allergies  No Known Allergies  Intoleranc    PAST MEDICAL & SURGICAL HISTORY:  NA    FAMILY HISTORY:  NA    SOCIAL HISTORY: Denies cigarette use, alcohol use, or illicit drug use    Vital Signs Last 24 Hrs  T(F): 99.1 (2024 16:51), Max: 99.1 (2024 16:51)  HR: 64 (2024 16:51) (64 - 64)  BP: 120/81 (2024 16:51) (120/81 - 120/81)  RR: 18 (2024 16:51) (18 - 18)  Height (cm): 152.4 (24 @ 16:51)  Weight (kg): 59.9 (24 @ 16:51)  BMI (kg/m2): 25.8 (24 @ 16:51)  BSA (m2): 1.56 (24 @ 16:51)      General Appearance - AAOx3, NAD  Abdomen - Soft, nontender, nondistended, no rebound, no rigidity, no guarding, bowel sounds present    Meds:   (ADM OVERRIDE) 1 each &lt;see task&gt; GiveOnce  (ADM OVERRIDE) 1 each &lt;see task&gt; GiveOnce  metoclopramide  IVPB 10 milliGRAM(s) IV Intermittent Once  ondansetron Injectable 4 milliGRAM(s) IV Push once  sodium chloride 0.9% Bolus 1000 milliLiter(s) IV Bolus once  sodium chloride 0.9% Bolus 1000 milliLiter(s) IV Bolus once    Height (cm): 152.4 (24 @ 16:51)  Weight (kg): 59.9 (24 @ 16:51)  BMI (kg/m2): 25.8 (24 @ 16:51)  BSA (m2): 1.56 (24 @ 16:51)    LABS:                        13.5   9.13  )-----------( 238      ( 2024 18:47 )             40.8     HCG Quantitative, Serum: 85595.0 mIU/mL (24 @ 18:47)    ABO RH Interpretation: O NEG (24 @ 18:47)  Antibody Screen: POS (24 @ 18:47)        138  |  105  |  6<L>  ----------------------------<  90  4.6   |  21  |  0.6<L>    Ca    9.4      2024 18:47    TPro  7.0  /  Alb  4.4  /  TBili  0.5  /  DBili  x   /  AST  19  /  ALT  13  /  AlkPhos  68        Urinalysis Basic - ( 2024 18:47 )    Color: x / Appearance: x / SG: x / pH: x  Gluc: 90 mg/dL / Ketone: x  / Bili: x / Urobili: x   Blood: x / Protein: x / Nitrite: x   Leuk Esterase: x / RBC: x / WBC x   Sq Epi: x / Non Sq Epi: x / Bacteria: x    RADIOLOGY & ADDITIONAL STUDIES:  < from: US Transvaginal (24 @ 20:24) >    INTERPRETATION:  CLINICAL INFORMATION: Vomiting, cramps. 9 weeks pregnant    LMP: 2024. Patient reports being pregnant at time of the exam.    COMPARISON: Pelvic ultrasound 2024.    TECHNIQUE:  Endovaginal and transabdominal pelvic sonogram. Color and Spectral   Doppler was performed.    FINDINGS:  Uterus: 9.4 x 5.4 x 7.7 cm. Visible intrauterine pregnancy with yolk sac.   Crown-rump length 1.77 cm. Gestational sac length 3.29 cm. Fetal heart   rate 173 bpm. Subchorionic hemorrhage measuring 2.6 x 1.0 x 1.5 cm.   Subserosal fibroid measuring up to 2 cm.    Right ovary: 3.6 x 1.8 x 3.6 cm. Within normal limits. Vascular flow   demonstrated to the right ovary.  Left ovary: Left ovary not visualized.    Fluid: None.      IMPRESSION:      Viable intrauterine pregnancy.    Estimated gestational age is 8 weeks and 2 days.    Subchorionic hemorrhage measuring 2.6 x 1.0 x 1.5 cm          < end of copied text >

## 2024-04-30 NOTE — ED PROVIDER NOTE - PATIENT PORTAL LINK FT
You can access the FollowMyHealth Patient Portal offered by [facility] by registering at the following website: [email or web address]. By joining [employer]’s FollowMyHealth portal, you will also be able to view your health information using other applications (apps) compatible with our system.

## 2024-04-30 NOTE — ED PROVIDER NOTE - NSFOLLOWUPINSTRUCTIONS_ED_ALL_ED_FT
PLEASE FOLLOW UP WITH YOUR OBGYN APPOINTMENT TOMORROW. TAKE ZOFRAN AS NEEDED.     Hyperemesis Gravidarum  Hyperemesis gravidarum is a severe form of nausea and vomiting that happens during pregnancy. Hyperemesis is worse than morning sickness. It may cause you to have nausea or vomiting all day for many days. It may keep you from eating and drinking enough food and liquids, which can lead to dehydration, malnutrition, and weight loss. Hyperemesis usually occurs during the first half (the first 20 weeks) of pregnancy. It often goes away once a woman is in her second half of pregnancy. However, sometimes hyperemesis continues through an entire pregnancy.    What are the causes?  The cause of this condition is not known. It may be related to changes in chemicals (hormones) in the body during pregnancy, such as the high level of pregnancy hormone (human chorionic gonadotropin) or the increase in the female sex hormone (estrogen).    What are the signs or symptoms?  Symptoms of this condition include:  Nausea that does not go away.  Vomiting that does not allow you to keep any food down.  Weight loss.  Body fluid loss (dehydration).  Having no desire to eat, or not liking food that you have previously enjoyed.  How is this diagnosed?  This condition may be diagnosed based on:  A physical exam.  Your medical history.  Your symptoms.  Blood tests.  Urine tests.  How is this treated?  This condition is managed by controlling symptoms. This may include:  Following an eating plan. This can help lessen nausea and vomiting.  Taking prescription medicines.  An eating plan and medicines are often used together to help control symptoms. If medicines do not help relieve nausea and vomiting, you may need to receive fluids through an IV at the hospital.    Follow these instructions at home:  Eating and drinking     Avoid the following:  Drinking fluids with meals. Try not to drink anything during the 30 minutes before and after your meals.  Drinking more than 1 cup of fluid at a time.  Eating foods that trigger your symptoms. These may include spicy foods, coffee, high-fat foods, very sweet foods, and acidic foods.  Skipping meals. Nausea can be more intense on an empty stomach. If you cannot tolerate food, do not force it. Try sucking on ice chips or other frozen items and make up for missed calories later.  Lying down within 2 hours after eating.  Being exposed to environmental triggers. These may include food smells, smoky rooms, closed spaces, rooms with strong smells, warm or humid places, overly loud and noisy rooms, and rooms with motion or flickering lights. Try eating meals in a well-ventilated area that is free of strong smells.  Quick and sudden changes in your movement.  Taking iron pills and multivitamins that contain iron. If you take prescription iron pills, do not stop taking them unless your health care provider approves.  Preparing food. The smell of food can spoil your appetite or trigger nausea.  To help relieve your symptoms:  Listen to your body. Everyone is different and has different preferences. Find what works best for you.  Eat and drink slowly.  Eat 5–6 small meals daily instead of 3 large meals. Eating small meals and snacks can help you avoid an empty stomach.  In the morning, before getting out of bed, eat a couple of crackers to avoid moving around on an empty stomach.  Try eating starchy foods as these are usually tolerated well. Examples include cereal, toast, bread, potatoes, pasta, rice, and pretzels.  Include at least 1 serving of protein with your meals and snacks. Protein options include lean meats, poultry, seafood, beans, nuts, nut butters, eggs, cheese, and yogurt.  Try eating a protein-rich snack before bed. Examples of a protein-bryson snack include cheese and crackers or a peanut butter sandwich made with 1 slice of whole-wheat bread and 1 tsp (5 g) of peanut butter.  Eat or suck on things that have ginger in them. It may help relieve nausea. Add ¼ tsp ground ginger to hot tea or choose ginger tea.  Try drinking 100% fruit juice or an electrolyte drink. An electrolyte drink contains sodium, potassium, and chloride.  Drink fluids that are cold, clear, and carbonated or sour. Examples include lemonade, ginger ale, lemon–lime soda, ice water, and sparkling water.  Brush your teeth or use a mouth rinse after meals.  Talk with your health care provider about starting a supplement of vitamin B6.  General instructions     Take over-the-counter and prescription medicines only as told by your health care provider.  Follow instructions from your health care provider about eating or drinking restrictions.  Continue to take your prenatal vitamins as told by your health care provider. If you are having trouble taking your prenatal vitamins, talk with your health care provider about different options.  Keep all follow-up and pre-birth (prenatal) visits as told by your health care provider. This is important.  Contact a health care provider if:  You have pain in your abdomen.  You have a severe headache.  You have vision problems.  You are losing weight.  You feel weak or dizzy.  Get help right away if:  You cannot drink fluids without vomiting.  You vomit blood.  You have constant nausea and vomiting.  You are very weak.  You faint.  You have a fever and your symptoms suddenly get worse.  Summary  Hyperemesis gravidarum is a severe form of nausea and vomiting that happens during pregnancy.  Making some changes to your eating habits may help relieve nausea and vomiting.  This condition may be managed with medicine.  If medicines do not help relieve nausea and vomiting, you may need to receive fluids through an IV at the hospital.  This information is not intended to replace advice given to you by your health care provider. Make sure you discuss any questions you have with your health care provider.    Subchorionic Hematoma    A subchorionic hematoma is a gathering of blood between the outer wall of the placenta and the inner wall of the womb (uterus). The placenta is the organ that connects the fetus to the wall of the uterus. The placenta performs the feeding, breathing (oxygen to the fetus), and waste removal (excretory work) of the fetus.     Subchorionic hematoma is the most common abnormality found on a result from ultrasonography done during the first trimester or early second trimester of pregnancy. If there has been little or no vaginal bleeding, early small hematomas usually shrink on their own and do not affect your baby or pregnancy. The blood is gradually absorbed over 1–2 weeks. When bleeding starts later in pregnancy or the hematoma is larger or occurs in an older pregnant woman, the outcome may not be as good. Larger hematomas may get bigger, which increases the chances for miscarriage. Subchorionic hematoma also increases the risk of premature detachment of the placenta from the uterus,  (premature) labor, and stillbirth.    HOME CARE INSTRUCTIONS  Stay on bed rest if your health care provider recommends this. Although bed rest will not prevent more bleeding or prevent a miscarriage, your health care provider may recommend bed rest until you are advised otherwise.  Avoid heavy lifting (more than 10 lb [4.5 kg]), exercise, sexual intercourse, or douching as directed by your health care provider.  Keep track of the number of pads you use each day and how soaked (saturated) they are. Write down this information.  Do not use tampons.  Keep all follow-up appointments as directed by your health care provider. Your health care provider may ask you to have follow-up blood tests or ultrasound tests or both.    SEEK IMMEDIATE MEDICAL CARE IF:  You have severe cramps in your stomach, back, abdomen, or pelvis.  You have a fever.  You pass large clots or tissue. Save any tissue for your health care provider to look at.  Your bleeding increases or you become lightheaded, feel weak, or have fainting episodes.    ADDITIONAL NOTES AND INSTRUCTIONS    Please follow up with your Primary MD in 24-48 hr.  Seek immediate medical care for any new/worsening signs or symptoms.

## 2024-04-30 NOTE — ED PROVIDER NOTE - PHYSICAL EXAMINATION
VITAL SIGNS: AFebrile, vital signs stable  CONSTITUTIONAL: Well-developed; well-nourished; in no acute distress.  SKIN: Skin exam is warm and dry, no acute rash.  HEAD: Normocephalic; atraumatic.  EYES: Pupils equal round reactive to light, Extraocular movements intact; conjunctiva and sclera clear.  ENT: No nasal discharge; airway clear. Moist mucus membranes.  NECK: Supple; non tender. No rigidity  CARD: Regular rate and rhythm. Normal S1, S2; no murmurs, gallops, or rubs.  RESP: Lungs clear to auscultation bilaterally. No wheezes, rales or rhonchi.  ABD: Abdomen soft; non-tender; non-distended;  no hepatosplenomegaly. No costovertebral angle tenderness.   EXT: Normal ROM. No clubbing, cyanosis or edema. No calf tenderness to palpation.  NEURO: Alert and oriented x 3. No focal deficits.  PSYCH: Cooperative, appropriate. VITAL SIGNS: AFebrile, vital signs stable  CONSTITUTIONAL: Well-developed; well-nourished; in no acute distress.  SKIN: Skin exam is warm and dry, no acute rash.  HEAD: Normocephalic; atraumatic.  EYES: Pupils equal round reactive to light, Extraocular movements intact; conjunctiva and sclera clear.  ENT: No nasal discharge; airway clear. DRY mucus membranes.  NECK: Supple; non tender. No rigidity  CARD: Regular rate and rhythm. Normal S1, S2; no murmurs, gallops, or rubs.  RESP: Lungs clear to auscultation bilaterally. No wheezes, rales or rhonchi.  ABD: Abdomen soft; non-tender; non-distended;  no hepatosplenomegaly. No costovertebral angle tenderness.   EXT: Normal ROM. No clubbing, cyanosis or edema. No calf tenderness to palpation.  NEURO: Alert and oriented x 3. No focal deficits.  PSYCH: Cooperative, appropriate.

## 2024-04-30 NOTE — ED PROVIDER NOTE - OBJECTIVE STATEMENT
33-year-old female G2, P0  currently 9 weeks pregnant presents with 1 month of intractable nausea vomiting, unable to tolerate p.o.   has first appointment to see her OB/GYN doctor Ashu Nicole tomorrow.   has had normal sono as outpt with this pregnancy.  Not currently on any medications for nausea, states she was told by her OB office to stop taking Zofran.  Previously took B6 and Unisom which did not help and states doxylamine made symptoms worse.  No abdominal pain diarrhea constipation.  No dysuria frequency hematuria.  No vaginal bleeding or discharge.  States she feels dehydrated and mouth is hyper salivating. 33-year-old female G2, P0  currently 9 weeks pregnant presents with 1 month of intractable nausea vomiting, unable to tolerate p.o.   has first appointment to see her OB/GYN doctor Ashu Nicole tomorrow.   has had normal sono as outpt with this pregnancy.  Not currently on any medications for nausea, states she was told by her OB office to stop taking Zofran.  Previously took B6 and Unisom which did not help and states doxylamine made symptoms worse.  No abdominal pain diarrhea constipation.  No dysuria frequency hematuria.  No vaginal bleeding or discharge.  States she feels dehydrated, has cramps, and mouth is hyper salivating.

## 2024-04-30 NOTE — CONSULT NOTE ADULT - ASSESSMENT
A/P: 34 yo  @8w5d, with N/V in pregnancy, clinically and hemodynamically stable  -Please PO trial, if pt able to tolerate PO, she is cleared for discharge  -f/u UA, Ucx  -please sent pt Zofran disintegrating tablets to the 24 hours [facility] on [address]  -Pt instructed in small meals, bland food, steve  -f/u tomorrow with PMD Dr. Sánchez in the office    d/w Dr. Carrillo and Dr. Sánchez

## 2024-04-30 NOTE — ED PROVIDER NOTE - CARE PLAN
1 Principal Discharge DX:	Hyperemesis gravidarum  Secondary Diagnosis:	Subchorionic hematoma in first trimester  Secondary Diagnosis:	Dehydration

## 2024-05-01 VITALS
DIASTOLIC BLOOD PRESSURE: 59 MMHG | OXYGEN SATURATION: 99 % | HEART RATE: 74 BPM | SYSTOLIC BLOOD PRESSURE: 111 MMHG | RESPIRATION RATE: 18 BRPM

## 2024-05-01 LAB
APPEARANCE UR: CLEAR — SIGNIFICANT CHANGE UP
BILIRUB UR-MCNC: NEGATIVE — SIGNIFICANT CHANGE UP
COLOR SPEC: YELLOW — SIGNIFICANT CHANGE UP
DIFF PNL FLD: NEGATIVE — SIGNIFICANT CHANGE UP
GLUCOSE UR QL: NEGATIVE MG/DL — SIGNIFICANT CHANGE UP
KETONES UR-MCNC: 80 MG/DL
LEUKOCYTE ESTERASE UR-ACNC: NEGATIVE — SIGNIFICANT CHANGE UP
NITRITE UR-MCNC: NEGATIVE — SIGNIFICANT CHANGE UP
PH UR: 6 — SIGNIFICANT CHANGE UP (ref 5–8)
PROT UR-MCNC: NEGATIVE MG/DL — SIGNIFICANT CHANGE UP
SP GR SPEC: 1.02 — SIGNIFICANT CHANGE UP (ref 1–1.03)
UROBILINOGEN FLD QL: 0.2 MG/DL — SIGNIFICANT CHANGE UP (ref 0.2–1)

## 2024-05-01 RX ORDER — ONDANSETRON 8 MG/1
1 TABLET, FILM COATED ORAL
Qty: 1 | Refills: 0
Start: 2024-05-01 | End: 2024-05-03

## 2024-05-01 RX ORDER — SODIUM CHLORIDE 9 MG/ML
1000 INJECTION INTRAMUSCULAR; INTRAVENOUS; SUBCUTANEOUS ONCE
Refills: 0 | Status: COMPLETED | OUTPATIENT
Start: 2024-05-01 | End: 2024-05-01

## 2024-05-01 RX ORDER — ONDANSETRON 8 MG/1
4 TABLET, FILM COATED ORAL ONCE
Refills: 0 | Status: COMPLETED | OUTPATIENT
Start: 2024-05-01 | End: 2024-05-01

## 2024-05-01 RX ORDER — FAMOTIDINE 10 MG/ML
20 INJECTION INTRAVENOUS ONCE
Refills: 0 | Status: COMPLETED | OUTPATIENT
Start: 2024-05-01 | End: 2024-05-01

## 2024-05-01 RX ORDER — ONDANSETRON 8 MG/1
1 TABLET, FILM COATED ORAL
Qty: 24 | Refills: 0
Start: 2024-05-01 | End: 2024-05-03

## 2024-05-01 RX ADMIN — SODIUM CHLORIDE 1000 MILLILITER(S): 9 INJECTION INTRAMUSCULAR; INTRAVENOUS; SUBCUTANEOUS at 00:26

## 2024-05-01 RX ADMIN — FAMOTIDINE 20 MILLIGRAM(S): 10 INJECTION INTRAVENOUS at 00:25

## 2024-05-01 RX ADMIN — ONDANSETRON 4 MILLIGRAM(S): 8 TABLET, FILM COATED ORAL at 00:25

## 2024-05-01 NOTE — ED ADULT NURSE NOTE - CAS DISCH CONDITION
Patient calls in with  stating he received a call from our office. Unable to find a message. Patient is scheduled at 11:00 today   Improved

## 2024-05-02 LAB
CULTURE RESULTS: SIGNIFICANT CHANGE UP
SPECIMEN SOURCE: SIGNIFICANT CHANGE UP

## 2024-05-03 DIAGNOSIS — E86.0 DEHYDRATION: ICD-10-CM

## 2024-05-03 DIAGNOSIS — Z3A.09 9 WEEKS GESTATION OF PREGNANCY: ICD-10-CM

## 2024-05-03 DIAGNOSIS — Z3A.08 8 WEEKS GESTATION OF PREGNANCY: ICD-10-CM

## 2024-05-03 DIAGNOSIS — O46.91 ANTEPARTUM HEMORRHAGE, UNSPECIFIED, FIRST TRIMESTER: ICD-10-CM

## 2024-05-03 DIAGNOSIS — O99.281 ENDOCRINE, NUTRITIONAL AND METABOLIC DISEASES COMPLICATING PREGNANCY, FIRST TRIMESTER: ICD-10-CM

## 2024-05-03 DIAGNOSIS — O21.9 VOMITING OF PREGNANCY, UNSPECIFIED: ICD-10-CM

## 2024-05-03 DIAGNOSIS — O21.0 MILD HYPEREMESIS GRAVIDARUM: ICD-10-CM

## 2024-05-25 ENCOUNTER — EMERGENCY (EMERGENCY)
Facility: HOSPITAL | Age: 34
LOS: 0 days | Discharge: HOME | End: 2024-05-26
Attending: EMERGENCY MEDICINE
Payer: COMMERCIAL

## 2024-05-25 VITALS
OXYGEN SATURATION: 98 % | TEMPERATURE: 100 F | SYSTOLIC BLOOD PRESSURE: 115 MMHG | HEART RATE: 81 BPM | HEIGHT: 60 IN | RESPIRATION RATE: 18 BRPM | WEIGHT: 125 LBS | DIASTOLIC BLOOD PRESSURE: 79 MMHG

## 2024-05-25 DIAGNOSIS — O21.0 MILD HYPEREMESIS GRAVIDARUM: ICD-10-CM

## 2024-05-25 DIAGNOSIS — Z3A.12 12 WEEKS GESTATION OF PREGNANCY: ICD-10-CM

## 2024-05-25 DIAGNOSIS — O21.9 VOMITING OF PREGNANCY, UNSPECIFIED: ICD-10-CM

## 2024-05-25 LAB
ALBUMIN SERPL ELPH-MCNC: 4.2 G/DL — SIGNIFICANT CHANGE UP (ref 3.5–5.2)
ALP SERPL-CCNC: 59 U/L — SIGNIFICANT CHANGE UP (ref 30–115)
ALT FLD-CCNC: 15 U/L — SIGNIFICANT CHANGE UP (ref 0–41)
ANION GAP SERPL CALC-SCNC: 11 MMOL/L — SIGNIFICANT CHANGE UP (ref 7–14)
APPEARANCE UR: ABNORMAL
AST SERPL-CCNC: 22 U/L — SIGNIFICANT CHANGE UP (ref 0–41)
B-OH-BUTYR SERPL-SCNC: 0.6 MMOL/L — HIGH
BASOPHILS # BLD AUTO: 0.02 K/UL — SIGNIFICANT CHANGE UP (ref 0–0.2)
BASOPHILS NFR BLD AUTO: 0.3 % — SIGNIFICANT CHANGE UP (ref 0–1)
BILIRUB SERPL-MCNC: 0.5 MG/DL — SIGNIFICANT CHANGE UP (ref 0.2–1.2)
BILIRUB UR-MCNC: ABNORMAL
BUN SERPL-MCNC: 4 MG/DL — LOW (ref 10–20)
CALCIUM SERPL-MCNC: 9.4 MG/DL — SIGNIFICANT CHANGE UP (ref 8.4–10.5)
CHLORIDE SERPL-SCNC: 103 MMOL/L — SIGNIFICANT CHANGE UP (ref 98–110)
CO2 SERPL-SCNC: 21 MMOL/L — SIGNIFICANT CHANGE UP (ref 17–32)
COLOR SPEC: SIGNIFICANT CHANGE UP
CREAT SERPL-MCNC: 0.6 MG/DL — LOW (ref 0.7–1.5)
DIFF PNL FLD: NEGATIVE — SIGNIFICANT CHANGE UP
EGFR: 121 ML/MIN/1.73M2 — SIGNIFICANT CHANGE UP
EOSINOPHIL # BLD AUTO: 0.1 K/UL — SIGNIFICANT CHANGE UP (ref 0–0.7)
EOSINOPHIL NFR BLD AUTO: 1.3 % — SIGNIFICANT CHANGE UP (ref 0–8)
GAS PNL BLDV: SIGNIFICANT CHANGE UP
GLUCOSE SERPL-MCNC: 89 MG/DL — SIGNIFICANT CHANGE UP (ref 70–99)
GLUCOSE UR QL: NEGATIVE MG/DL — SIGNIFICANT CHANGE UP
HCG SERPL-ACNC: HIGH MIU/ML
HCT VFR BLD CALC: 39.7 % — SIGNIFICANT CHANGE UP (ref 37–47)
HGB BLD-MCNC: 13.6 G/DL — SIGNIFICANT CHANGE UP (ref 12–16)
IMM GRANULOCYTES NFR BLD AUTO: 0.1 % — SIGNIFICANT CHANGE UP (ref 0.1–0.3)
KETONES UR-MCNC: >=160 MG/DL
LEUKOCYTE ESTERASE UR-ACNC: ABNORMAL
LIDOCAIN IGE QN: 53 U/L — SIGNIFICANT CHANGE UP (ref 7–60)
LYMPHOCYTES # BLD AUTO: 1.29 K/UL — SIGNIFICANT CHANGE UP (ref 1.2–3.4)
LYMPHOCYTES # BLD AUTO: 17.2 % — LOW (ref 20.5–51.1)
MCHC RBC-ENTMCNC: 29.1 PG — SIGNIFICANT CHANGE UP (ref 27–31)
MCHC RBC-ENTMCNC: 34.3 G/DL — SIGNIFICANT CHANGE UP (ref 32–37)
MCV RBC AUTO: 84.8 FL — SIGNIFICANT CHANGE UP (ref 81–99)
MONOCYTES # BLD AUTO: 0.57 K/UL — SIGNIFICANT CHANGE UP (ref 0.1–0.6)
MONOCYTES NFR BLD AUTO: 7.6 % — SIGNIFICANT CHANGE UP (ref 1.7–9.3)
NEUTROPHILS # BLD AUTO: 5.52 K/UL — SIGNIFICANT CHANGE UP (ref 1.4–6.5)
NEUTROPHILS NFR BLD AUTO: 73.5 % — SIGNIFICANT CHANGE UP (ref 42.2–75.2)
NITRITE UR-MCNC: NEGATIVE — SIGNIFICANT CHANGE UP
NRBC # BLD: 0 /100 WBCS — SIGNIFICANT CHANGE UP (ref 0–0)
PH UR: 5.5 — SIGNIFICANT CHANGE UP (ref 5–8)
PLATELET # BLD AUTO: 208 K/UL — SIGNIFICANT CHANGE UP (ref 130–400)
PMV BLD: 11.8 FL — HIGH (ref 7.4–10.4)
POTASSIUM SERPL-MCNC: 3.5 MMOL/L — SIGNIFICANT CHANGE UP (ref 3.5–5)
POTASSIUM SERPL-SCNC: 3.5 MMOL/L — SIGNIFICANT CHANGE UP (ref 3.5–5)
PROT SERPL-MCNC: 6.8 G/DL — SIGNIFICANT CHANGE UP (ref 6–8)
PROT UR-MCNC: 30 MG/DL
RBC # BLD: 4.68 M/UL — SIGNIFICANT CHANGE UP (ref 4.2–5.4)
RBC # FLD: 13.5 % — SIGNIFICANT CHANGE UP (ref 11.5–14.5)
SODIUM SERPL-SCNC: 135 MMOL/L — SIGNIFICANT CHANGE UP (ref 135–146)
SP GR SPEC: 1.03 — SIGNIFICANT CHANGE UP (ref 1–1.03)
UROBILINOGEN FLD QL: 1 MG/DL — SIGNIFICANT CHANGE UP (ref 0.2–1)
WBC # BLD: 7.51 K/UL — SIGNIFICANT CHANGE UP (ref 4.8–10.8)
WBC # FLD AUTO: 7.51 K/UL — SIGNIFICANT CHANGE UP (ref 4.8–10.8)

## 2024-05-25 PROCEDURE — 84295 ASSAY OF SERUM SODIUM: CPT

## 2024-05-25 PROCEDURE — 85014 HEMATOCRIT: CPT

## 2024-05-25 PROCEDURE — 99285 EMERGENCY DEPT VISIT HI MDM: CPT

## 2024-05-25 PROCEDURE — 76856 US EXAM PELVIC COMPLETE: CPT

## 2024-05-25 PROCEDURE — 85018 HEMOGLOBIN: CPT

## 2024-05-25 PROCEDURE — 76856 US EXAM PELVIC COMPLETE: CPT | Mod: 26

## 2024-05-25 PROCEDURE — 99284 EMERGENCY DEPT VISIT MOD MDM: CPT | Mod: 25

## 2024-05-25 PROCEDURE — 87086 URINE CULTURE/COLONY COUNT: CPT

## 2024-05-25 PROCEDURE — 96375 TX/PRO/DX INJ NEW DRUG ADDON: CPT

## 2024-05-25 PROCEDURE — 83690 ASSAY OF LIPASE: CPT

## 2024-05-25 PROCEDURE — 96374 THER/PROPH/DIAG INJ IV PUSH: CPT

## 2024-05-25 PROCEDURE — 80053 COMPREHEN METABOLIC PANEL: CPT

## 2024-05-25 PROCEDURE — 36415 COLL VENOUS BLD VENIPUNCTURE: CPT

## 2024-05-25 PROCEDURE — 82330 ASSAY OF CALCIUM: CPT

## 2024-05-25 PROCEDURE — 84702 CHORIONIC GONADOTROPIN TEST: CPT

## 2024-05-25 PROCEDURE — 85025 COMPLETE CBC W/AUTO DIFF WBC: CPT

## 2024-05-25 PROCEDURE — 82803 BLOOD GASES ANY COMBINATION: CPT

## 2024-05-25 PROCEDURE — 83605 ASSAY OF LACTIC ACID: CPT

## 2024-05-25 PROCEDURE — 81001 URINALYSIS AUTO W/SCOPE: CPT

## 2024-05-25 PROCEDURE — 84132 ASSAY OF SERUM POTASSIUM: CPT

## 2024-05-25 PROCEDURE — 82010 KETONE BODYS QUAN: CPT

## 2024-05-25 RX ORDER — SODIUM CHLORIDE 9 MG/ML
1000 INJECTION INTRAMUSCULAR; INTRAVENOUS; SUBCUTANEOUS ONCE
Refills: 0 | Status: COMPLETED | OUTPATIENT
Start: 2024-05-25 | End: 2024-05-25

## 2024-05-25 RX ORDER — FAMOTIDINE 10 MG/ML
20 INJECTION INTRAVENOUS ONCE
Refills: 0 | Status: COMPLETED | OUTPATIENT
Start: 2024-05-25 | End: 2024-05-25

## 2024-05-25 RX ORDER — SODIUM CHLORIDE 9 MG/ML
1000 INJECTION, SOLUTION INTRAVENOUS
Refills: 0 | Status: DISCONTINUED | OUTPATIENT
Start: 2024-05-25 | End: 2024-05-26

## 2024-05-25 RX ORDER — ONDANSETRON 8 MG/1
4 TABLET, FILM COATED ORAL ONCE
Refills: 0 | Status: COMPLETED | OUTPATIENT
Start: 2024-05-25 | End: 2024-05-25

## 2024-05-25 RX ORDER — METOCLOPRAMIDE HCL 10 MG
10 TABLET ORAL ONCE
Refills: 0 | Status: COMPLETED | OUTPATIENT
Start: 2024-05-25 | End: 2024-05-25

## 2024-05-25 RX ADMIN — Medication 104 MILLIGRAM(S): at 23:42

## 2024-05-25 RX ADMIN — SODIUM CHLORIDE 1000 MILLILITER(S): 9 INJECTION INTRAMUSCULAR; INTRAVENOUS; SUBCUTANEOUS at 18:56

## 2024-05-25 RX ADMIN — SODIUM CHLORIDE 1000 MILLILITER(S): 9 INJECTION, SOLUTION INTRAVENOUS at 20:11

## 2024-05-25 RX ADMIN — ONDANSETRON 4 MILLIGRAM(S): 8 TABLET, FILM COATED ORAL at 18:56

## 2024-05-25 RX ADMIN — FAMOTIDINE 20 MILLIGRAM(S): 10 INJECTION INTRAVENOUS at 20:35

## 2024-05-25 NOTE — ED PROVIDER NOTE - DIFFERENTIAL DIAGNOSIS
Ectopic pregnancy, spontaneous Ab, anemia, electrolyte abnormalities, JAYY, dehydration, UTI, Ovarian cyst, Ovarian torsion.  r/o dehydration. Differential Diagnosis

## 2024-05-25 NOTE — ED PROVIDER NOTE - ATTENDING APP SHARED VISIT CONTRIBUTION OF CARE
Patient presents to ED for evaluation of nausea/vomiting and is pregnant.   Vitals reviewed.   Lungs: CTA, no wheezing, no crackles.  Abd: +BS, NT, ND, soft  A/P: Nausea/vomiting in pregnancy,   Labs, US to r/o ectopic,   Medications, IVF,   reevaluation.

## 2024-05-25 NOTE — CONSULT NOTE ADULT - SUBJECTIVE AND OBJECTIVE BOX
Chief Complaint: nausea    HPI: 32yo  at 12w2d by LMP , presents with nausea and vomiting. Pt has had multiple past ED visit for similar complaints. States she regularly takes Zofran 2-3x/day as well as B6/unisom, but reports symptoms have been worsening. Reports vomiting 2-3x per day and constant nausea but states for the past couple days vomiting has increased to 6-8x/day. Last ate some crackers this afternoon, which she vomited. Denies fevers/chills, abdominal pain, vaginal bleeding, or urinary symptoms.       Ob/Gyn History:                   LMP -                   Cycle Length - regular  SAB x1  Denies history of ovarian cysts, uterine fibroids, abnormal paps, or STIs    Home Medications:  None    Allergies  No Known Allergies  Intoleranc    PAST MEDICAL & SURGICAL HISTORY:  NA    FAMILY HISTORY:  NA    SOCIAL HISTORY: Denies cigarette use, alcohol use, or illicit drug    VITALS  T(C): 37.8 (24 @ 17:03), Max: 37.8 (24 @ 17:03)  T(F): 100 (24 @ 17:03), Max: 100 (24 @ 17:03)  HR: 81 (24 @ 17:03) (81 - 81)  BP: 115/79 (24 @ 17:03) (115/79 - 115/79)  RR: 18 (24 @ 17:03) (18 - 18)  SpO2: 98% (24 @ 17:03) (98% - 98%)    General Appearance - AAOx3, NAD  Abdomen - Soft, nontender, nondistended, no rebound, no rigidity, no guarding, bowel sounds present    GYN/Pelvis: deferred    Meds:   famotidine Injectable 20 milliGRAM(s) IV Push once  ondansetron Injectable 4 milliGRAM(s) IV Push once  sodium chloride 0.9% Bolus 1000 milliLiter(s) IV Bolus once    Height (cm): 152.4 (24 @ 17:03)  Weight (kg): 56.7 (24 @ 17:03)  BMI (kg/m2): 24.4 (24 @ 17:03)  BSA (m2): 1.53 (24 @ 17:03)    LABS:                        13.6   7.51  )-----------( 208      ( 25 May 2024 19:30 )             39.7     HCG Quantitative, Serum: 20047.0 mIU/mL (24 @ 19:30)          135  |  103  |  4<L>  ----------------------------<  89  3.5   |  21  |  0.6<L>    Ca    9.4      25 May 2024 19:30    TPro  6.8  /  Alb  4.2  /  TBili  0.5  /  DBili  x   /  AST  22  /  ALT  15  /  AlkPhos  59        Urinalysis Basic - ( 25 May 2024 19:30 )    Color: x / Appearance: x / SG: x / pH: x  Gluc: 89 mg/dL / Ketone: x  / Bili: x / Urobili: x   Blood: x / Protein: x / Nitrite: x   Leuk Esterase: x / RBC: x / WBC x   Sq Epi: x / Non Sq Epi: x / Bacteria: x           Chief Complaint: nausea    HPI: 32yo  at 12w2d by LMP , presents with nausea and vomiting. Pt has had multiple past ED visit for similar complaints. States she regularly takes Zofran 2-3x/day as well as B6/unisom, but reports symptoms have been worsening. Reports vomiting 2-3x per day and constant nausea but states for the past couple days vomiting has increased to 6-8x/day. Last ate some crackers this afternoon, which she vomited. Denies fevers/chills, abdominal pain, vaginal bleeding, or urinary symptoms.       Ob/Gyn History:                   LMP -                   Cycle Length - regular  SAB x1  Denies history of ovarian cysts, uterine fibroids, abnormal paps, or STIs    Home Medications:  None    Allergies  No Known Allergies  Intoleranc    PAST MEDICAL & SURGICAL HISTORY:  NA    FAMILY HISTORY:  NA    SOCIAL HISTORY: Denies cigarette use, alcohol use, or illicit drug    VITALS  T(C): 37.8 (24 @ 17:03), Max: 37.8 (24 @ 17:03)  T(F): 100 (24 @ 17:03), Max: 100 (24 @ 17:03)  HR: 81 (24 @ 17:03) (81 - 81)  BP: 115/79 (24 @ 17:03) (115/79 - 115/79)  RR: 18 (24 @ 17:03) (18 - 18)  SpO2: 98% (24 @ 17:03) (98% - 98%)    General Appearance - AAOx3, NAD  Abdomen - Soft, nontender, nondistended, no rebound, no rigidity, no guarding, bowel sounds present    GYN/Pelvis: deferred    Meds:   famotidine Injectable 20 milliGRAM(s) IV Push once  ondansetron Injectable 4 milliGRAM(s) IV Push once  sodium chloride 0.9% Bolus 1000 milliLiter(s) IV Bolus once    Height (cm): 152.4 (24 @ 17:03)  Weight (kg): 56.7 (24 @ 17:03)  BMI (kg/m2): 24.4 (24 @ 17:03)  BSA (m2): 1.53 (24 @ 17:03)    LABS:                        13.6   7.51  )-----------( 208      ( 25 May 2024 19:30 )             39.7     HCG Quantitative, Serum: 62854.0 mIU/mL (24 @ 19:30)          135  |  103  |  4<L>  ----------------------------<  89  3.5   |  21  |  0.6<L>    Ca    9.4      25 May 2024 19:30    TPro  6.8  /  Alb  4.2  /  TBili  0.5  /  DBili  x   /  AST  22  /  ALT  15  /  AlkPhos  59        Urinalysis Basic - ( 25 May 2024 19:30 )    Color: x / Appearance: x / SG: x / pH: x  Gluc: 89 mg/dL / Ketone: x  / Bili: x / Urobili: x   Blood: x / Protein: x / Nitrite: x   Leuk Esterase: x / RBC: x / WBC x   Sq Epi: x / Non Sq Epi: x / Bacteria: x      RADIOLOGY  < from: US Pelvis Complete (24 @ 21:26) >    ACC: [id number] EXAM:  US PELVIC COMPLETE   ORDERED BY: GERMAN GAITAN     PROCEDURE DATE:  2024          INTERPRETATION:  CLINICAL INFORMATION: Pregnant, unable to tolerate by PO   intake.    LMP: 2024    COMPARISON: Pelvic ultrasound 2024    TECHNIQUE:  Endovaginal and transabdominal pelvic sonogram. Color and Spectral   Doppler was performed.    FINDINGS:  Uterus: 11.9 x 6.8 x 8.1 cm. Single live intrauterine pregnancy with   approximate gestational age of 12 weeks 6 days by femoral length. Fetal   heart rate 1 65 bpm.  Right ovary: 1.7 cm x 1.3 cm x 1.6 cm. Within normal limits. Vascular   flow is visualized within the ovary.  Left ovary: 2.2 cm x 1.0 cm x 1.0 cm. Within normal limits. Vascular flow   is visualized within the ovary.    Fluid: None.    IMPRESSION:  Single live intrauterine pregnancy with approximate gestational age of 12   weeks 6 days. Fetal heart rate 165 bpm.        --- End of Report ---          CHLOE BRASWELL MD; Resident Radiologist  This documenthas been electronically signed.  DIOGO LU MD; Attending Radiologist  This document has been electronically signed. May 25 2024 10:41PM    < end of copied text >

## 2024-05-25 NOTE — ED PROVIDER NOTE - CARE PROVIDER_API CALL
Rachael Garcia  Midwif[facility]  [address] [place] [id number]  Phone: [phone]  Fax: [phone]  Established Patient  Follow Up Time: 4-6 Days

## 2024-05-25 NOTE — ED ADULT NURSE NOTE - OBJECTIVE STATEMENT
Pt c/o 12 weeks pregnant, n/v since 5 weeks pregnant, unable to keep anything down since yesterday. Pt has prescription zofran and states its not working

## 2024-05-25 NOTE — ED PROVIDER NOTE - OBJECTIVE STATEMENT
at approx 12 weeks by LMP 24 presents for eval/tx of persistent n/v since beg of pregnancy despite zofran, unable to tolerate po. Denies fever/chill/HA/dizziness/chest pain/palpitation/sob/abd pain/d/ black stool/bloody stool/urinary sxs
Patient

## 2024-05-25 NOTE — ED PROVIDER NOTE - NSFOLLOWUPINSTRUCTIONS_ED_ALL_ED_FT
Hyperemesis Gravidarum  Hyperemesis gravidarum is a severe form of nausea and vomiting that happens during pregnancy. Hyperemesis is worse than morning sickness. It may cause you to have nausea or vomiting all day for many days. It may keep you from eating and drinking enough food and liquids, which can lead to dehydration, malnutrition, and weight loss. Hyperemesis usually occurs during the first half (the first 20 weeks) of pregnancy. It often goes away once a woman is in her second half of pregnancy. However, sometimes hyperemesis continues through an entire pregnancy.    What are the causes?  The cause of this condition is not known. It may be related to changes in chemicals (hormones) in the body during pregnancy, such as the high level of pregnancy hormone (human chorionic gonadotropin) or the increase in the female sex hormone (estrogen).    What are the signs or symptoms?  Symptoms of this condition include:  Nausea that does not go away.  Vomiting that does not allow you to keep any food down.  Weight loss.  Body fluid loss (dehydration).  Having no desire to eat, or not liking food that you have previously enjoyed.  How is this diagnosed?  This condition may be diagnosed based on:  A physical exam.  Your medical history.  Your symptoms.  Blood tests.  Urine tests.  How is this treated?  This condition is managed by controlling symptoms. This may include:  Following an eating plan. This can help lessen nausea and vomiting.  Taking prescription medicines.  An eating plan and medicines are often used together to help control symptoms. If medicines do not help relieve nausea and vomiting, you may need to receive fluids through an IV at the hospital.    Follow these instructions at home:  Eating and drinking     Avoid the following:  Drinking fluids with meals. Try not to drink anything during the 30 minutes before and after your meals.  Drinking more than 1 cup of fluid at a time.  Eating foods that trigger your symptoms. These may include spicy foods, coffee, high-fat foods, very sweet foods, and acidic foods.  Skipping meals. Nausea can be more intense on an empty stomach. If you cannot tolerate food, do not force it. Try sucking on ice chips or other frozen items and make up for missed calories later.  Lying down within 2 hours after eating.  Being exposed to environmental triggers. These may include food smells, smoky rooms, closed spaces, rooms with strong smells, warm or humid places, overly loud and noisy rooms, and rooms with motion or flickering lights. Try eating meals in a well-ventilated area that is free of strong smells.  Quick and sudden changes in your movement.  Taking iron pills and multivitamins that contain iron. If you take prescription iron pills, do not stop taking them unless your health care provider approves.  Preparing food. The smell of food can spoil your appetite or trigger nausea.  To help relieve your symptoms:  Listen to your body. Everyone is different and has different preferences. Find what works best for you.  Eat and drink slowly.  Eat 5–6 small meals daily instead of 3 large meals. Eating small meals and snacks can help you avoid an empty stomach.  In the morning, before getting out of bed, eat a couple of crackers to avoid moving around on an empty stomach.  Try eating starchy foods as these are usually tolerated well. Examples include cereal, toast, bread, potatoes, pasta, rice, and pretzels.  Include at least 1 serving of protein with your meals and snacks. Protein options include lean meats, poultry, seafood, beans, nuts, nut butters, eggs, cheese, and yogurt.  Try eating a protein-rich snack before bed. Examples of a protein-bryson snack include cheese and crackers or a peanut butter sandwich made with 1 slice of whole-wheat bread and 1 tsp (5 g) of peanut butter.  Eat or suck on things that have ginger in them. It may help relieve nausea. Add ¼ tsp ground ginger to hot tea or choose ginger tea.  Try drinking 100% fruit juice or an electrolyte drink. An electrolyte drink contains sodium, potassium, and chloride.  Drink fluids that are cold, clear, and carbonated or sour. Examples include lemonade, ginger ale, lemon–lime soda, ice water, and sparkling water.  Brush your teeth or use a mouth rinse after meals.  Talk with your health care provider about starting a supplement of vitamin B6.  General instructions     Take over-the-counter and prescription medicines only as told by your health care provider.  Follow instructions from your health care provider about eating or drinking restrictions.  Continue to take your prenatal vitamins as told by your health care provider. If you are having trouble taking your prenatal vitamins, talk with your health care provider about different options.  Keep all follow-up and pre-birth (prenatal) visits as told by your health care provider. This is important.  Contact a health care provider if:  You have pain in your abdomen.  You have a severe headache.  You have vision problems.  You are losing weight.  You feel weak or dizzy.  Get help right away if:  You cannot drink fluids without vomiting.  You vomit blood.  You have constant nausea and vomiting.  You are very weak.  You faint.  You have a fever and your symptoms suddenly get worse.  Summary  Hyperemesis gravidarum is a severe form of nausea and vomiting that happens during pregnancy.  Making some changes to your eating habits may help relieve nausea and vomiting.  This condition may be managed with medicine.  If medicines do not help relieve nausea and vomiting, you may need to receive fluids through an IV at the hospital.  This information is not intended to replace advice given to you by your health care provider. Make sure you discuss any questions you have with your health care provider.

## 2024-05-25 NOTE — CONSULT NOTE ADULT - ASSESSMENT
A/P: 34 yo  at 12w2d, with N/V in pregnancy, clinically and hemodynamically stable  - Recommend IV reglan   - PO trial and reassess symptoms  - f/u TVUS    d/w Dr. Rogers ([facility]) A/P: 32 yo  at 12w2d, with N/V in pregnancy, clinically and hemodynamically stable  - s/p successful PO trial after IV Reglan  - Recommended PO Reglan 5mg TID   - outpatient follow-up in 1 week with primary OBGYN  - cleared by gyn, dispo per ED    d/w Dr. Martínez and Rachael ([facility])

## 2024-05-25 NOTE — ED PROVIDER NOTE - PATIENT PORTAL LINK FT
You can access the FollowMyHealth Patient Portal offered by [facility] by registering at the following website: [email or web address]. By joining [employer]’s FollowMyHealth portal, you will also be able to view your health information using other applications (apps) compatible with our system.

## 2024-05-26 VITALS
RESPIRATION RATE: 18 BRPM | TEMPERATURE: 98 F | HEART RATE: 66 BPM | SYSTOLIC BLOOD PRESSURE: 103 MMHG | OXYGEN SATURATION: 99 % | DIASTOLIC BLOOD PRESSURE: 69 MMHG

## 2024-05-27 LAB
CULTURE RESULTS: SIGNIFICANT CHANGE UP
SPECIMEN SOURCE: SIGNIFICANT CHANGE UP

## 2024-06-05 ENCOUNTER — EMERGENCY (EMERGENCY)
Facility: HOSPITAL | Age: 34
LOS: 0 days | Discharge: HOME | End: 2024-06-05
Attending: EMERGENCY MEDICINE
Payer: COMMERCIAL

## 2024-06-05 VITALS
TEMPERATURE: 98 F | HEIGHT: 60 IN | RESPIRATION RATE: 18 BRPM | OXYGEN SATURATION: 98 % | SYSTOLIC BLOOD PRESSURE: 106 MMHG | HEART RATE: 80 BPM | DIASTOLIC BLOOD PRESSURE: 76 MMHG | WEIGHT: 121.92 LBS

## 2024-06-05 DIAGNOSIS — O21.9 VOMITING OF PREGNANCY, UNSPECIFIED: ICD-10-CM

## 2024-06-05 DIAGNOSIS — Z3A.14 14 WEEKS GESTATION OF PREGNANCY: ICD-10-CM

## 2024-06-05 DIAGNOSIS — R10.9 UNSPECIFIED ABDOMINAL PAIN: ICD-10-CM

## 2024-06-05 DIAGNOSIS — O26.892 OTHER SPECIFIED PREGNANCY RELATED CONDITIONS, SECOND TRIMESTER: ICD-10-CM

## 2024-06-05 DIAGNOSIS — O26.891 OTHER SPECIFIED PREGNANCY RELATED CONDITIONS, FIRST TRIMESTER: ICD-10-CM

## 2024-06-05 DIAGNOSIS — O21.0 MILD HYPEREMESIS GRAVIDARUM: ICD-10-CM

## 2024-06-05 LAB
ABO RH CONFIRMATION: SIGNIFICANT CHANGE UP
ALBUMIN SERPL ELPH-MCNC: 4.2 G/DL — SIGNIFICANT CHANGE UP (ref 3.5–5.2)
ALP SERPL-CCNC: 68 U/L — SIGNIFICANT CHANGE UP (ref 30–115)
ALT FLD-CCNC: 45 U/L — HIGH (ref 0–41)
ANION GAP SERPL CALC-SCNC: 14 MMOL/L — SIGNIFICANT CHANGE UP (ref 7–14)
APPEARANCE UR: CLEAR — SIGNIFICANT CHANGE UP
AST SERPL-CCNC: 40 U/L — SIGNIFICANT CHANGE UP (ref 0–41)
BASOPHILS # BLD AUTO: 0.02 K/UL — SIGNIFICANT CHANGE UP (ref 0–0.2)
BASOPHILS NFR BLD AUTO: 0.3 % — SIGNIFICANT CHANGE UP (ref 0–1)
BILIRUB SERPL-MCNC: 0.6 MG/DL — SIGNIFICANT CHANGE UP (ref 0.2–1.2)
BILIRUB UR-MCNC: NEGATIVE — SIGNIFICANT CHANGE UP
BLD GP AB SCN SERPL QL: SIGNIFICANT CHANGE UP
BUN SERPL-MCNC: 4 MG/DL — LOW (ref 10–20)
CALCIUM SERPL-MCNC: 9.4 MG/DL — SIGNIFICANT CHANGE UP (ref 8.4–10.5)
CHLORIDE SERPL-SCNC: 103 MMOL/L — SIGNIFICANT CHANGE UP (ref 98–110)
CO2 SERPL-SCNC: 19 MMOL/L — SIGNIFICANT CHANGE UP (ref 17–32)
COLOR SPEC: SIGNIFICANT CHANGE UP
CREAT SERPL-MCNC: 0.6 MG/DL — LOW (ref 0.7–1.5)
DIFF PNL FLD: NEGATIVE — SIGNIFICANT CHANGE UP
EGFR: 121 ML/MIN/1.73M2 — SIGNIFICANT CHANGE UP
EOSINOPHIL # BLD AUTO: 0.1 K/UL — SIGNIFICANT CHANGE UP (ref 0–0.7)
EOSINOPHIL NFR BLD AUTO: 1.3 % — SIGNIFICANT CHANGE UP (ref 0–8)
GLUCOSE SERPL-MCNC: 74 MG/DL — SIGNIFICANT CHANGE UP (ref 70–99)
GLUCOSE UR QL: NEGATIVE MG/DL — SIGNIFICANT CHANGE UP
HCT VFR BLD CALC: 36.9 % — LOW (ref 37–47)
HGB BLD-MCNC: 12.8 G/DL — SIGNIFICANT CHANGE UP (ref 12–16)
IMM GRANULOCYTES NFR BLD AUTO: 0.4 % — HIGH (ref 0.1–0.3)
KETONES UR-MCNC: >=160 MG/DL
LEUKOCYTE ESTERASE UR-ACNC: NEGATIVE — SIGNIFICANT CHANGE UP
LIDOCAIN IGE QN: 36 U/L — SIGNIFICANT CHANGE UP (ref 7–60)
LYMPHOCYTES # BLD AUTO: 1.05 K/UL — LOW (ref 1.2–3.4)
LYMPHOCYTES # BLD AUTO: 13.3 % — LOW (ref 20.5–51.1)
MCHC RBC-ENTMCNC: 29.7 PG — SIGNIFICANT CHANGE UP (ref 27–31)
MCHC RBC-ENTMCNC: 34.7 G/DL — SIGNIFICANT CHANGE UP (ref 32–37)
MCV RBC AUTO: 85.6 FL — SIGNIFICANT CHANGE UP (ref 81–99)
MONOCYTES # BLD AUTO: 0.71 K/UL — HIGH (ref 0.1–0.6)
MONOCYTES NFR BLD AUTO: 9 % — SIGNIFICANT CHANGE UP (ref 1.7–9.3)
NEUTROPHILS # BLD AUTO: 6.01 K/UL — SIGNIFICANT CHANGE UP (ref 1.4–6.5)
NEUTROPHILS NFR BLD AUTO: 75.7 % — HIGH (ref 42.2–75.2)
NITRITE UR-MCNC: NEGATIVE — SIGNIFICANT CHANGE UP
NRBC # BLD: 0 /100 WBCS — SIGNIFICANT CHANGE UP (ref 0–0)
PH UR: 6.5 — SIGNIFICANT CHANGE UP (ref 5–8)
PLATELET # BLD AUTO: 195 K/UL — SIGNIFICANT CHANGE UP (ref 130–400)
PMV BLD: 12.6 FL — HIGH (ref 7.4–10.4)
POTASSIUM SERPL-MCNC: 3.9 MMOL/L — SIGNIFICANT CHANGE UP (ref 3.5–5)
POTASSIUM SERPL-SCNC: 3.9 MMOL/L — SIGNIFICANT CHANGE UP (ref 3.5–5)
PROT SERPL-MCNC: 6.9 G/DL — SIGNIFICANT CHANGE UP (ref 6–8)
PROT UR-MCNC: SIGNIFICANT CHANGE UP MG/DL
RBC # BLD: 4.31 M/UL — SIGNIFICANT CHANGE UP (ref 4.2–5.4)
RBC # FLD: 13.9 % — SIGNIFICANT CHANGE UP (ref 11.5–14.5)
SODIUM SERPL-SCNC: 136 MMOL/L — SIGNIFICANT CHANGE UP (ref 135–146)
SP GR SPEC: 1.03 — SIGNIFICANT CHANGE UP (ref 1–1.03)
UROBILINOGEN FLD QL: 1 MG/DL — SIGNIFICANT CHANGE UP (ref 0.2–1)
WBC # BLD: 7.92 K/UL — SIGNIFICANT CHANGE UP (ref 4.8–10.8)
WBC # FLD AUTO: 7.92 K/UL — SIGNIFICANT CHANGE UP (ref 4.8–10.8)

## 2024-06-05 PROCEDURE — 83690 ASSAY OF LIPASE: CPT

## 2024-06-05 PROCEDURE — 86901 BLOOD TYPING SEROLOGIC RH(D): CPT

## 2024-06-05 PROCEDURE — 81003 URINALYSIS AUTO W/O SCOPE: CPT

## 2024-06-05 PROCEDURE — 96374 THER/PROPH/DIAG INJ IV PUSH: CPT

## 2024-06-05 PROCEDURE — 86900 BLOOD TYPING SEROLOGIC ABO: CPT

## 2024-06-05 PROCEDURE — 85025 COMPLETE CBC W/AUTO DIFF WBC: CPT

## 2024-06-05 PROCEDURE — 86850 RBC ANTIBODY SCREEN: CPT

## 2024-06-05 PROCEDURE — 99285 EMERGENCY DEPT VISIT HI MDM: CPT

## 2024-06-05 PROCEDURE — 76857 US EXAM PELVIC LIMITED: CPT

## 2024-06-05 PROCEDURE — 99284 EMERGENCY DEPT VISIT MOD MDM: CPT | Mod: 25

## 2024-06-05 PROCEDURE — 96376 TX/PRO/DX INJ SAME DRUG ADON: CPT

## 2024-06-05 PROCEDURE — 36415 COLL VENOUS BLD VENIPUNCTURE: CPT

## 2024-06-05 PROCEDURE — 80053 COMPREHEN METABOLIC PANEL: CPT

## 2024-06-05 RX ORDER — METOCLOPRAMIDE HCL 10 MG
10 TABLET ORAL ONCE
Refills: 0 | Status: COMPLETED | OUTPATIENT
Start: 2024-06-05 | End: 2024-06-05

## 2024-06-05 RX ORDER — SODIUM CHLORIDE 9 MG/ML
1700 INJECTION, SOLUTION INTRAVENOUS ONCE
Refills: 0 | Status: COMPLETED | OUTPATIENT
Start: 2024-06-05 | End: 2024-06-05

## 2024-06-05 RX ORDER — FAMOTIDINE 10 MG/ML
20 INJECTION INTRAVENOUS ONCE
Refills: 0 | Status: COMPLETED | OUTPATIENT
Start: 2024-06-05 | End: 2024-06-05

## 2024-06-05 RX ORDER — PYRIDOXINE HCL (VITAMIN B6) 100 MG
100 TABLET ORAL ONCE
Refills: 0 | Status: COMPLETED | OUTPATIENT
Start: 2024-06-05 | End: 2024-06-05

## 2024-06-05 RX ADMIN — Medication 104 MILLIGRAM(S): at 22:42

## 2024-06-05 RX ADMIN — Medication 100 MILLIGRAM(S): at 20:14

## 2024-06-05 RX ADMIN — FAMOTIDINE 20 MILLIGRAM(S): 10 INJECTION INTRAVENOUS at 20:18

## 2024-06-05 RX ADMIN — Medication 104 MILLIGRAM(S): at 20:14

## 2024-06-05 RX ADMIN — SODIUM CHLORIDE 1700 MILLILITER(S): 9 INJECTION, SOLUTION INTRAVENOUS at 20:14

## 2024-06-05 NOTE — ED ADULT NURSE NOTE - NSFALLUNIVINTERV_ED_ALL_ED
Bed/Stretcher in lowest position, wheels locked, appropriate side rails in place/Call bell, personal items and telephone in reach/Instruct patient to call for assistance before getting out of bed/chair/stretcher/Non-slip footwear applied when patient is off stretcher/[place] to call system/Physically safe environment - no spills, clutter or unnecessary equipment/Purposeful proactive rounding/Room/bathroom lighting operational, light cord in reach

## 2024-06-05 NOTE — ED PROVIDER NOTE - ATTENDING CONTRIBUTION TO CARE
33-year-old female no past medical history presents.  Worsening nausea vomiting during her pregnancy.  Patient currently 14 weeks pregnant and has been diagnosed with Hyperemesis as well as hyper Salivation.  Patient states that she has been on multiple regimens for her nausea.  Overall Reglan has been helping her the most.  Denies any abdominal cramping or vaginal bleeding at this time.  Patient reports that she had a televisit with the high  yesterday who changed her medication regimen.  States that today she did her nausea vomiting worsens has been unable to tolerate anything by mouth today.  Has not eaten during the entire day.  No fevers or chills.  No urinary symptoms.    CONSTITUTIONAL: Well-developed; well-nourished; in no acute distress.   SKIN: warm, dry  HEAD: Normocephalic; atraumatic.  EYES: PERRL, EOMI, no conjunctival erythema  ENT: No nasal discharge; airway clear.  NECK: Supple; non tender.  CARD: S1, S2 normal;  Regular rate and rhythm.   RESP: No wheezes, rales or rhonchi.  ABD: soft non tender, non distended, no rebound or guarding  EXT: Normal ROM.  5/5 strength in all 4 extremities   LYMPH: No acute cervical adenopathy.  NEURO: Alert, oriented, grossly unremarkable. neurovascularly intact  PSYCH: Cooperative, appropriate.

## 2024-06-05 NOTE — ED PROVIDER NOTE - CARE PLAN
1 Principal Discharge DX:	Hyperemesis gravidarum   Adbry Pregnancy And Lactation Text: It is unknown if this medication will adversely affect pregnancy or breast feeding.

## 2024-06-05 NOTE — ED PROVIDER NOTE - PROGRESS NOTE DETAILS
POCUS ; pt tolerated PO spoke w/ OBGYN who rec home care w/ reg; obgyn spoke w/ Primary OBGYN who wants to see pt on monday outpt. obgyn states that they will try to figure out IVhome meds for hyperemesis. The patient / caregiver given detailed return precautions and advised to return to the emergency department if any new symptoms developed, symptoms worsened or for any concerns. The patient / caregiver offered the opportunity to ask questions and verbalized that they understand the diagnosis and discharge instructions.

## 2024-06-05 NOTE — ED PROVIDER NOTE - OBJECTIVE STATEMENT
He is here for an employment physical, however it is on a [employer] form.  No problems, no concerns  
33-year-old female no past medical history presents.  Worsening nausea vomiting during her pregnancy.  Patient currently 14 weeks pregnant and has been diagnosed with Hyperemesis and  hyper Salivation gravidarum.  Patient states that she has been on multiple regimens for her nausea.  Overall IV Reglan has been helping her the most.  Denies any abdominal cramping or vaginal bleeding at this time.  Patient reports that she had a televisit with the high  yesterday who changed her medication regimen.  States that today she did her nausea vomiting worsens has been unable to tolerate anything by mouth today.  Has not eaten during the entire day.  No fevers or chills.  No urinary symptoms.

## 2024-06-05 NOTE — ED PROVIDER NOTE - PATIENT PORTAL LINK FT
You can access the FollowMyHealth Patient Portal offered by [facility] by registering at the following website: [email or web address]. By joining [employer]’s FollowMyHealth portal, you will also be able to view your health information using other applications (apps) compatible with our system.

## 2024-06-05 NOTE — ED ADULT NURSE REASSESSMENT NOTE - NS ED NURSE REASSESS COMMENT FT1
Received pt from RN Casale. Pt AOx4. Respirations even and unlabored on RA. Pt currently c/o nausea. MD notified of pt's symptoms. Comfort and safety measures maintained

## 2024-06-05 NOTE — ED PROVIDER NOTE - NSFOLLOWUPINSTRUCTIONS_ED_ALL_ED_FT
Hyperemesis Gravidarum    WHAT YOU NEED TO KNOW:    What is hyperemesis gravidarum? Hyperemesis gravidarum is a severe form of nausea and vomiting that happens during pregnancy. Hyperemesis is more severe than morning sickness. It may cause you to have nausea or vomiting all day for many days. It may also keep you from getting enough food and liquid.    What increases my risk for hyperemesis gravidarum? The cause of hyperemesis is not known. Any of the following can increase your risk:  •Pregnant with more than 1 baby  •History of motion sickness or migraine headaches  •Hyperemesis with a previous pregnancy  •Family history of hyperemesis   •Pregnant with a girl baby    What are the signs and symptoms of hyperemesis gravidarum? Signs and symptoms usually begin in the first trimester (first 12 weeks). Hyperemesis often goes away during the second trimester (after 20 weeks) but may continue through the entire pregnancy.   •Severe nausea and vomiting, and dry retching   •Easily affected by strong smells  •Poor appetite or change in taste  •Symptoms of dehydration, such as dark yellow urine, dry mouth and lips, dry skin, and urinating less than usual  •Fatigue  •Dizziness when you stand  •Weight loss    How is hyperemesis gravidarum diagnosed? Your healthcare provider will examine you and check your weight. He or she will test for other problems. If no other problems are found, your provider will diagnose hyperemesis gravidarum. Blood and urine tests may show dehydration. The tests may also show how well your organs are working. You may need an ultrasound to make sure your baby is okay.     How is hyperemesis gravidarum treated and managed? You may be admitted to the hospital if you are dehydrated. Your healthcare provider may suggest any of the following:   •Eat small amounts of food every 1 to 2 hours. Some examples of good foods to eat include broth, toast, fruit, eggs, gelatin, or cottage cheese. Do not eat spicy or high-fat foods. Try to eat crackers before getting out of bed each morning. Foods and drinks with ginger, such as ginger ale, may help to decrease nausea and vomiting.    •Drink liquids as directed. You may need to drink small amounts of liquid often to prevent dehydration. Ask how much liquid to drink each day and which liquids are best for you.     •Rest when you need to. Start activity slowly and work up to your usual routine as you start to feel better.    •Avoid things that may make hyperemesis worse. Avoid odors, heat, and humidity. Limit noise and flickering lights.     •Medicines, vitamins, or supplements may be given to help decrease nausea and vomiting.    •Weigh yourself daily if directed by your healthcare provider. You may need to keep a record of your daily weights for your healthcare provider. He or she may want to make sure you are not losing too much weight.    When should I seek immediate care?   •You have signs of severe dehydration including little to no urine and dry mouth or lips.  •You have severe stomach pain.  •You feel too weak or dizzy to stand up.  •You see blood in your vomit or bowel movements.    When should I contact my healthcare provider?   •You cannot keep any food or liquid down.  •You are losing weight.  •You have a fever.  •You have questions or concerns about your condition or care.    CARE AGREEMENT:    You have the right to help plan your care. Learn about your health condition and how it may be treated. Discuss treatment options with your healthcare providers to decide what care you want to receive. You always have the right to refuse treatment.     © Copyright [employer] 2021    DISCHARGE INSTRUCTIONS  - Please follow up with your doctor in 1-3 days  - Return to ED if you notice worsening vomiting, develop diarrhea, develop fevers, signs of respiratory distress, decreased oral intake, decreased wet diapers or any other concerning symptoms

## 2024-06-05 NOTE — ED PROVIDER NOTE - CLINICAL SUMMARY MEDICAL DECISION MAKING FREE TEXT BOX
Patient presents with n/v during pregnancy. hx of hyperemesis. labs, ua done. normal FHR. Feeling better after fluids and medications. Seen by OB who will help to arrange outpatient IV management. Patient cleared for discharge. Tolerating PO. Discharged with OB follow up and return precautions.

## 2024-06-05 NOTE — CONSULT NOTE ADULT - SUBJECTIVE AND OBJECTIVE BOX
HPI: 32yo  at 13w6d by LMP , presents with nausea and vomiting. Pt has had multiple past ED visit for similar complaints. Most recent ED visit, patient was discharged home with PO Reglan and B6/unisom. Instructed to discontinue Zofran as it provided minimal relief of symptoms. Endorses while on Reglan PO 5mg TID regimen for the past 1-2 weeks she was doing much better and able to tolerate normal meals with mild nausea/vomiting. States yesterday had telehealth consultation with JOHANN who had switch her back to Zofran. Since then pt endorses severe nausea and 6-8x/day vomiting episodes with poor PO intake. Last ate 2 spanikopitas at 1pm and shortly vomited meal back up. Denies fevers/chills, abdominal pain, vaginal bleeding, or urinary symptoms.       Ob/Gyn History:                   LMP -                   Cycle Length - regular  SAB x1  Denies history of ovarian cysts, uterine fibroids, abnormal paps, or STIs    Home Medications:  None    Allergies  No Known Allergies  Intoleranc    PAST MEDICAL & SURGICAL HISTORY:  NA    FAMILY HISTORY:  NA    SOCIAL HISTORY: Denies cigarette use, alcohol use, or illicit drug  Vital Signs Last 24 Hrs  T(F): 98.2 (2024 17:55), Max: 98.2 (2024 17:55)  HR: 80 (2024 17:55) (80 - 80)  BP: 106/76 (2024 17:55) (106/76 - 106/76)  RR: 18 (2024 17:55) (18 - 18)  Height (cm): 152.4 (24 @ 17:55)  Weight (kg): 55.3 (24 @ 17:55)  BMI (kg/m2): 23.8 (24 @ 17:55)  BSA (m2): 1.51 (24 @ 17:55)    General Appearance - AAOx3, NAD  Heart - S1S2 regular rate and rhythm  Lung - CTA Bilaterally  Abdomen - Soft, nontender, nondistended, no rebound, no rigidity, no guarding, bowel sounds present    GYN/Pelvis: deferred  BSS: FHR 158bpm, per ED resident   Meds:   (ADM OVERRIDE) 1 each &lt;see task&gt; GiveOnce  (ADM OVERRIDE) 1 each &lt;see task&gt; GiveOnce  famotidine    Tablet 20 milliGRAM(s) Oral once  lactated ringers Bolus 1700 milliLiter(s) IV Bolus once  metoclopramide  IVPB 10 milliGRAM(s) IV Intermittent Once  pyridoxine 100 milliGRAM(s) Oral Once    Height (cm): 152.4 (24 @ 17:55)  Weight (kg): 55.3 (24 @ 17:55)  BMI (kg/m2): 23.8 (24 @ 17:55)  BSA (m2): 1.51 (24 @ 17:55)    LABS:                        12.8   7.92  )-----------(  20:50 )             36.9

## 2024-06-05 NOTE — CONSULT NOTE ADULT - ASSESSMENT
32yo  at 13w6d by LMP , presents with N/V in pregnancy. Clinically and hemodynamically stable  - PO trial, will reevaluate    D/w Dr. Martínez and Dr. Gil 32yo  at 13w6d by LMP , presents with N/V in pregnancy. Clinically and hemodynamically stable  - s/p successful PO trial after IV reglan and Pepcid  - Recommend discontinuing zofran and restarting reglan PO 5mg TID for nausea/vomiting  - outpatient follow-up on 06/10 with primary OBGYN  - cleared by gyn, dispo per ED    D/w Dr. Martínez and Dr. Gil

## 2025-01-13 NOTE — ED ADULT NURSE NOTE - NSFALLUNIVINTERV_ED_ALL_ED
Is This A New Presentation, Or A Follow-Up?: Skin Lesions Bed/Stretcher in lowest position, wheels locked, appropriate side rails in place/Call bell, personal items and telephone in reach/Instruct patient to call for assistance before getting out of bed/chair/stretcher/Non-slip footwear applied when patient is off stretcher/[place] to call system/Physically safe environment - no spills, clutter or unnecessary equipment/Purposeful proactive rounding/Room/bathroom lighting operational, light cord in reach